# Patient Record
Sex: FEMALE | Race: WHITE | Employment: OTHER | URBAN - METROPOLITAN AREA
[De-identification: names, ages, dates, MRNs, and addresses within clinical notes are randomized per-mention and may not be internally consistent; named-entity substitution may affect disease eponyms.]

---

## 2019-07-02 ENCOUNTER — HOSPITAL ENCOUNTER (OUTPATIENT)
Dept: PREADMISSION TESTING | Age: 69
Discharge: HOME | End: 2019-07-06
Payer: MEDICARE

## 2019-07-02 VITALS
BODY MASS INDEX: 26.01 KG/M2 | OXYGEN SATURATION: 97 % | WEIGHT: 156.09 LBS | HEIGHT: 65 IN | HEART RATE: 74 BPM | RESPIRATION RATE: 16 BRPM | SYSTOLIC BLOOD PRESSURE: 177 MMHG | DIASTOLIC BLOOD PRESSURE: 83 MMHG

## 2019-07-02 LAB
HCT VFR BLD CALC: 43.7 % (ref 36.3–47.1)
HEMOGLOBIN: 14.1 G/DL (ref 11.9–15.1)
MCH RBC QN AUTO: 28.3 PG (ref 25.2–33.5)
MCHC RBC AUTO-ENTMCNC: 32.3 G/DL (ref 28.4–34.8)
MCV RBC AUTO: 87.8 FL (ref 82.6–102.9)
NRBC AUTOMATED: 0 PER 100 WBC
PDW BLD-RTO: 13.8 % (ref 11.8–14.4)
PLATELET # BLD: 220 K/UL (ref 138–453)
PMV BLD AUTO: 10 FL (ref 8.1–13.5)
RBC # BLD: 4.98 M/UL (ref 3.95–5.11)
WBC # BLD: 6.6 K/UL (ref 3.5–11.3)

## 2019-07-02 PROCEDURE — 36415 COLL VENOUS BLD VENIPUNCTURE: CPT

## 2019-07-02 PROCEDURE — 93005 ELECTROCARDIOGRAM TRACING: CPT | Performed by: ANESTHESIOLOGY

## 2019-07-02 PROCEDURE — 85027 COMPLETE CBC AUTOMATED: CPT

## 2019-07-02 RX ORDER — LEVOTHYROXINE SODIUM 0.07 MG/1
75 TABLET ORAL DAILY
COMMUNITY

## 2019-07-02 NOTE — H&P
History and Physical Service   [facility]    HISTORY AND PHYSICAL EXAMINATION            Date of Evaluation: 7/2/2019  Patient name:  Booker Cope  MRN:   [id number]  YOB: 1950  PCP:    Radames Pyle MD    History Obtained From:     Patient, medical records    History of Present Illness: This is Booker Cope a 81 y.o. female presents to [facility] for upcoming Right foot Padgett bunionectomy with 2nd Met Osteotomy by  Dr Giovani Mead for Right HAV scheduled on 7/10/19 @1030. The patient c/o bunion on right foot for many years that has progressively worsened. . More recently 2nd toe developed pain as well that is aggravated by standing long periods, walking and wearing closed shoes. She has developed a callus but no open sores. Due to discomfort agreed to elective surgical intervention. The patient denies fever, chills, night sweats, pain or unexplained weight loss. Past Medical History:     Past Medical History:   Diagnosis Date    Anesthesia complication     was told after her hysterectomy that \"they almost lost her\" d/t an anesthesia complication, but never told her what it was, has had anes. since without any problems    Kidney cysts 03/2019    Palpitations     recently d/t the heat (pt's air conditioner isn't working) written 7/2/2019    Splenic cyst 03/2019    Tennis elbow     left    Thyroid disease         Past Surgical History:     Past Surgical History:   Procedure Laterality Date    BACK SURGERY      disc removed (lumbar)    BREAST SURGERY Right     cyst removed (benign)    COLONOSCOPY      DILATION AND CURETTAGE OF UTERUS      HYSTERECTOMY          Medications Prior to Admission:     Prior to Admission medications    Medication Sig Start Date End Date Taking?  Authorizing Provider   levothyroxine (SYNTHROID) 75 MCG tablet Take 75 mcg by mouth Daily   Yes Historical Provider, MD        Allergies:     Bactrim [sulfamethoxazole-trimethoprim]    Social atraumatic. Eye: no icterus, redness, pupils equal and reactive, extraocular eye movements intact, conjunctiva clear  Ear: normal external ear, no discharge, hearing intact  Nose:  no drainage noted  Mouth: mucous membranes moist  Neck: supple, no carotid bruits, thyroid not palpable  Lungs: Bilateral equal air entry, clear to ausculation, no wheezing, rales or rhonchi, normal effort  Cardiovascular: normal rate, regular rhythm, no murmur, gallop, rub. Abdomen: Soft, nontender, nondistended, normal bowel sounds, no hepatomegaly or splenomegaly  Neurologic: There are no new focal motor or sensory deficits, normal muscle tone and bulk, no abnormal sensation, normal speech, cranial nerves II through XII grossly intact  Skin: No gross lesions, rashes, bruising or bleeding on exposed skin area  Extremities: Bunion right hallux with callus  Second right toe mild deformity with tenderness at base peripheral pulses palpable, no pedal edema or calf pain with palpation  Psych: normal affect     Investigations:      Laboratory Testing:  Recent Results (from the past 24 hour(s))   CBC    Collection Time: 07/02/19 11:51 AM   Result Value Ref Range    WBC 6.6 3.5 - 11.3 k/uL    RBC 4.98 3.95 - 5.11 m/uL    Hemoglobin 14.1 11.9 - 15.1 g/dL    Hematocrit 43.7 36.3 - 47.1 %    MCV 87.8 82.6 - 102.9 fL    MCH 28.3 25.2 - 33.5 pg    MCHC 32.3 28.4 - 34.8 g/dL    RDW 13.8 11.8 - 14.4 %    Platelets 417 990 - 184 k/uL    MPV 10.0 8.1 - 13.5 fL    NRBC Automated 0.0 0.0 per 100 WBC       Recent Labs     07/02/19  1151   HGB 14.1   HCT 43.7   WBC 6.6   MCV 87.8       Imaging/Diagnostics:        Diagnosis:      1. Right HAV    Plans:     1.  Right foot Padgett bunionectomy with 2nd Met Osteotomy     Jose Ponce, APRN - CNP  7/2/2019  12:17 PM

## 2019-07-02 NOTE — PRE-PROCEDURE INSTRUCTIONS
ARRIVE  [place] Wednesday, July 10 at 9:00 AM    Once you enter the hospital lobby, take the elevators to the second floor. Check-In is at the surgery registration desk. If you have been given a blood band, you must bring it with you the day of surgery. Continue to take your home medications as you normally do up to and including the night before surgery with the exception of any blood thinning medications. Please stop any blood thinning medications as directed by your surgeon or prescribing physician. Failure to stop certain medications may interfere with your scheduled surgery. These may include:  Aspirin, Warfarin (Coumadin), Clopidogrel (Plavix), Ibuprofen (Motrin, Advil), Naproxen (Aleve), Meloxicam (Mobic), Celecoxib (Celebrex), Eliquis, Pradaxa, Xarelto, Effient, Fish Oil, Herbal supplements. If you are diabetic, do not take any of your diabetic medications by mouth the morning of surgery. If you are taking insulin contact the doctor that manages your diabetes for instructions about any changes to your insulin dosages the day before surgery. Do not inject insulin or other injectable diabetic medications the morning of surgery unless otherwise instructed by the doctor who manages your diabetes. Please take the following medication(s) the day of surgery with a small sip of water:  synthroid  Please use your inhalers at home the day of surgery. PREPARING FOR YOUR SURGERY:     Before surgery, you can play an important role in your own health. Because skin is not sterile, we need to be sure that your skin is as free of germs as possible before surgery by carefully washing before surgery. Preparing or prepping skin before surgery can reduce the risk of a surgical site infection.   Do not shave the area of your body where your surgery will be performed unless you received specific permission from your physician.     You will need to shower at home the night before surgery and the morning of surgery with a special soap called chlorhexidine gluconate (CHG*). *Not to be used by people allergic to Chlorhexidine Gluconate (CHG). Following these instructions will help you be sure that your skin is clean before surgery. Instructions on cleaning your skin before surgery: The night before your surgery:      You will need to shower with warm water (not hot) and the CHG soap.  Use a clean wash cloth and a clean towel. Have clean clothes available to put on after the shower.    First wash your hair with regular shampoo. Rinse your hair and body thoroughly to remove the shampoo. Iyer Wash your face with your regular soap or water only. Thoroughly rinse your body with warm water from the neck down.  Turn water off to prevent rinsing the soap off too soon.  With a clean wet washcloth and half of the CHG soap in the bottle, lather your entire body from the neck down. Do not use CHG soap near your eyes or ears to avoid injury to those areas.  Wash thoroughly, paying special attention to the area where your surgery will be performed.  Wash your body gently for five (5) minutes. Avoid scrubbing your skin too hard.  Turn the water back on and rinse your body thoroughly.  Pat yourself dry with a clean, soft towel. Do not apply lotion, cream or powder.  Dress with clean freshly washed clothes. The morning of surgery:     Repeat shower following steps above - using remaining half of CHG soap in bottle. Patient Instructions:    Iyer If you are having any type of anesthesia you are to have nothing to eat or drink after midnight the night before your surgery. This includes gum, mints, water or smoking or chewing tobacco.  The only exception to this is a small sip of water to take with any morning dose of heart, blood pressure, or seizure medications. No alcoholic beverages for 24 hours prior to surgery.      Bring a list of all Surgery/Procedure: You will need a friend or family member to drive you home after your procedure. Your  must be 25years of age or older and able to sign off on your discharge instructions. A taxi cab or any other form of public transportation is not acceptable. Your friend or family member must stay at the hospital throughout your procedure. Someone must remain with you for the first 24 hours after your surgery if you receive anesthesia or medication. If you do not have someone to stay with you, your procedure may be cancelled.       If you have any other questions regarding your procedure or the day of surgery, please call [phone]      _________________________  ____________________________  Signature (Patient)              Signature (Provider) & date

## 2019-07-03 LAB
EKG ATRIAL RATE: 69 BPM
EKG P AXIS: 66 DEGREES
EKG P-R INTERVAL: 150 MS
EKG Q-T INTERVAL: 378 MS
EKG QRS DURATION: 88 MS
EKG QTC CALCULATION (BAZETT): 405 MS
EKG R AXIS: 22 DEGREES
EKG T AXIS: 59 DEGREES
EKG VENTRICULAR RATE: 69 BPM

## 2023-09-07 ENCOUNTER — APPOINTMENT (OUTPATIENT)
Dept: CT IMAGING | Age: 73
End: 2023-09-07
Payer: MEDICARE

## 2023-09-07 ENCOUNTER — APPOINTMENT (OUTPATIENT)
Dept: GENERAL RADIOLOGY | Age: 73
End: 2023-09-07
Payer: MEDICARE

## 2023-09-07 ENCOUNTER — HOSPITAL ENCOUNTER (INPATIENT)
Age: 73
LOS: 1 days | Discharge: HOME | End: 2023-09-09
Attending: EMERGENCY MEDICINE | Admitting: HOSPITALIST
Payer: MEDICARE

## 2023-09-07 DIAGNOSIS — G45.9 TIA (TRANSIENT ISCHEMIC ATTACK): Primary | ICD-10-CM

## 2023-09-07 LAB
ALBUMIN SERPL-MCNC: 4 G/DL (ref 3.5–5.2)
ALP SERPL-CCNC: 169 U/L (ref 35–104)
ALT SERPL-CCNC: 19 U/L (ref 5–33)
ANION GAP SERPL CALCULATED.3IONS-SCNC: 14 MMOL/L (ref 9–17)
AST SERPL-CCNC: 29 U/L
BASOPHILS # BLD: <0.03 K/UL (ref 0–0.2)
BASOPHILS NFR BLD: 0 % (ref 0–2)
BILIRUB SERPL-MCNC: 0.5 MG/DL (ref 0.3–1.2)
BILIRUB UR QL STRIP: NEGATIVE
BUN SERPL-MCNC: 17 MG/DL (ref 8–23)
BUN/CREAT SERPL: 24 (ref 9–20)
CALCIUM SERPL-MCNC: 9.2 MG/DL (ref 8.6–10.4)
CHLORIDE SERPL-SCNC: 101 MMOL/L (ref 98–107)
CLARITY UR: CLEAR
CO2 SERPL-SCNC: 23 MMOL/L (ref 20–31)
COLOR UR: YELLOW
CREAT SERPL-MCNC: 0.7 MG/DL (ref 0.5–0.9)
EOSINOPHIL # BLD: 0.06 K/UL (ref 0–0.44)
EOSINOPHILS RELATIVE PERCENT: 1 % (ref 1–4)
EPI CELLS #/AREA URNS HPF: NORMAL /HPF (ref 0–5)
ERYTHROCYTE [DISTWIDTH] IN BLOOD BY AUTOMATED COUNT: 13.4 % (ref 11.8–14.4)
GFR SERPL CREATININE-BSD FRML MDRD: >60 ML/MIN/1.73M2
GLUCOSE BLD-MCNC: 131 MG/DL (ref 65–105)
GLUCOSE SERPL-MCNC: 118 MG/DL (ref 70–99)
GLUCOSE UR STRIP-MCNC: NEGATIVE MG/DL
HCT VFR BLD AUTO: 44.6 % (ref 36.3–47.1)
HGB BLD-MCNC: 14.5 G/DL (ref 11.9–15.1)
HGB UR QL STRIP.AUTO: ABNORMAL
IMM GRANULOCYTES # BLD AUTO: 0.01 K/UL (ref 0–0.3)
IMM GRANULOCYTES NFR BLD: 0 %
INR PPP: 0.9
KETONES UR STRIP-MCNC: NEGATIVE MG/DL
LEUKOCYTE ESTERASE UR QL STRIP: ABNORMAL
LYMPHOCYTES NFR BLD: 2.29 K/UL (ref 1.1–3.7)
LYMPHOCYTES RELATIVE PERCENT: 34 % (ref 24–43)
MCH RBC QN AUTO: 29.1 PG (ref 25.2–33.5)
MCHC RBC AUTO-ENTMCNC: 32.5 G/DL (ref 28.4–34.8)
MCV RBC AUTO: 89.4 FL (ref 82.6–102.9)
MONOCYTES NFR BLD: 0.39 K/UL (ref 0.1–1.2)
MONOCYTES NFR BLD: 6 % (ref 3–12)
NEUTROPHILS NFR BLD: 59 % (ref 36–65)
NEUTS SEG NFR BLD: 4.04 K/UL (ref 1.5–8.1)
NITRITE UR QL STRIP: NEGATIVE
NRBC BLD-RTO: 0 PER 100 WBC
PARTIAL THROMBOPLASTIN TIME: 27.5 SEC (ref 23–36.5)
PH UR STRIP: 7 [PH] (ref 5–8)
PLATELET # BLD AUTO: 182 K/UL (ref 138–453)
PMV BLD AUTO: 10.8 FL (ref 8.1–13.5)
POTASSIUM SERPL-SCNC: 4.1 MMOL/L (ref 3.7–5.3)
PROT SERPL-MCNC: 7.4 G/DL (ref 6.4–8.3)
PROT UR STRIP-MCNC: NEGATIVE MG/DL
PROTHROMBIN TIME: 11.6 SEC (ref 11.7–14.9)
RBC # BLD AUTO: 4.99 M/UL (ref 3.95–5.11)
RBC #/AREA URNS HPF: NORMAL /HPF (ref 0–2)
SODIUM SERPL-SCNC: 138 MMOL/L (ref 135–144)
SP GR UR STRIP: 1.01 (ref 1–1.03)
TROPONIN I SERPL HS-MCNC: <6 NG/L (ref 0–14)
UROBILINOGEN UR STRIP-ACNC: NORMAL EU/DL (ref 0–1)
WBC #/AREA URNS HPF: NORMAL /HPF (ref 0–5)
WBC OTHER # BLD: 6.8 K/UL (ref 3.5–11.3)

## 2023-09-07 PROCEDURE — 80053 COMPREHEN METABOLIC PANEL: CPT

## 2023-09-07 PROCEDURE — 70496 CT ANGIOGRAPHY HEAD: CPT

## 2023-09-07 PROCEDURE — 85025 COMPLETE CBC W/AUTO DIFF WBC: CPT

## 2023-09-07 PROCEDURE — 81001 URINALYSIS AUTO W/SCOPE: CPT

## 2023-09-07 PROCEDURE — 93005 ELECTROCARDIOGRAM TRACING: CPT | Performed by: EMERGENCY MEDICINE

## 2023-09-07 PROCEDURE — 82947 ASSAY GLUCOSE BLOOD QUANT: CPT

## 2023-09-07 PROCEDURE — 85610 PROTHROMBIN TIME: CPT

## 2023-09-07 PROCEDURE — 85730 THROMBOPLASTIN TIME PARTIAL: CPT

## 2023-09-07 PROCEDURE — 84484 ASSAY OF TROPONIN QUANT: CPT

## 2023-09-07 PROCEDURE — 70450 CT HEAD/BRAIN W/O DYE: CPT

## 2023-09-07 PROCEDURE — 6360000004 HC RX CONTRAST MEDICATION: Performed by: EMERGENCY MEDICINE

## 2023-09-07 PROCEDURE — 99285 EMERGENCY DEPT VISIT HI MDM: CPT

## 2023-09-07 PROCEDURE — 2580000003 HC RX 258: Performed by: EMERGENCY MEDICINE

## 2023-09-07 PROCEDURE — 71045 X-RAY EXAM CHEST 1 VIEW: CPT

## 2023-09-07 RX ORDER — SODIUM CHLORIDE 0.9 % (FLUSH) 0.9 %
10 SYRINGE (ML) INJECTION PRN
Status: DISCONTINUED | OUTPATIENT
Start: 2023-09-07 | End: 2023-09-09 | Stop reason: HOSPADM

## 2023-09-07 RX ORDER — CLOPIDOGREL BISULFATE 75 MG/1
300 TABLET ORAL ONCE
Status: COMPLETED | OUTPATIENT
Start: 2023-09-08 | End: 2023-09-08

## 2023-09-07 RX ORDER — 0.9 % SODIUM CHLORIDE 0.9 %
80 INTRAVENOUS SOLUTION INTRAVENOUS ONCE
Status: COMPLETED | OUTPATIENT
Start: 2023-09-07 | End: 2023-09-07

## 2023-09-07 RX ORDER — ATORVASTATIN CALCIUM 80 MG/1
80 TABLET, FILM COATED ORAL NIGHTLY
Status: DISCONTINUED | OUTPATIENT
Start: 2023-09-08 | End: 2023-09-09 | Stop reason: HOSPADM

## 2023-09-07 RX ORDER — ASPIRIN 81 MG/1
324 TABLET, CHEWABLE ORAL ONCE
Status: COMPLETED | OUTPATIENT
Start: 2023-09-08 | End: 2023-09-08

## 2023-09-07 RX ADMIN — SODIUM CHLORIDE 80 ML: 9 INJECTION, SOLUTION INTRAVENOUS at 21:06

## 2023-09-07 RX ADMIN — SODIUM CHLORIDE, PRESERVATIVE FREE 10 ML: 5 INJECTION INTRAVENOUS at 21:06

## 2023-09-07 RX ADMIN — IOPAMIDOL 75 ML: 755 INJECTION, SOLUTION INTRAVENOUS at 21:06

## 2023-09-07 NOTE — ED PROVIDER NOTES
[facility]  Emergency Department Encounter  Emergency Medicine Physician Note     Pt Mauri French  MRN: [id number]  [address] 1950  Date of evaluation: 9/7/23  PCP:  Giselle Mckeon MD  Note Started: 7:22 PM EDT      CHIEF COMPLAINT       Chief Complaint   Patient presents with    Hypertension    Extremity Weakness     Pt to ED with complaints on numbness/tingling to right side of face and weakness to the lower extremities. Pt states that symptoms started about 45 minutes PTA while she was eating dinner. Pt denies any chest pain or SOB. HISTORY OF PRESENT ILLNESS  (Location/Symptom, Timing/Onset, Context/Setting, Quality, Duration, Modifying Factors, Severity.)      George Jade is a 72 y.o. female who presents with onset numbness and tingling on the right face and right side of the body, with weakness on the right side of the body. Patient also describes some vision changes. Patient states sudden onset while eating dinner approximately around 645. Patient has a history of carotid stenosis has been monitored with ultrasounds. Patient has a history of hypertension,'s been controlled on blood pressure medication. Patient noted to be hypertensive here. Patient describes generalized fatigue at this time, describes change in phonation. Patient denies any lightheadedness dizziness, chest pain at this time, did describe some chest pressure/tightness at the onset of symptoms. .  Patient has any abdominal pain. PAST MEDICAL / SURGICAL / SOCIAL / FAMILY HISTORY      has a past medical history of Anesthesia complication, Kidney cysts, Palpitations, Splenic cyst, Tennis elbow, and Thyroid disease. No additional pertinent        has a past surgical history that includes Hysterectomy (11/01/1999); back surgery (06/10/2010); Breast surgery (Right, 09/1978); Colonoscopy (06/2014); and Dilation and curettage of uterus.   No additional pertinent       Social History     Socioeconomic Discussed patient with neurology, they are recommending 300 mg Plavix, 40 of Lipitor, and request admission for MRI. Will admit patient. Plan to also give patient aspirin. [CR]   5362 Neurology also recommended 75 mg Plavix daily. [CR]      ED Course User Index  [CR] Carlos Dolan,        PROCEDURES:  None Performed   Procedures    DATA FOR LAB AND RADIOLOGY TESTS ORDERED BELOW ARE REVIEWED BY THE ED CLINICIAN:    RADIOLOGY: All x-rays, CT, MRI, and formal ultrasound images (except ED bedside ultrasound) are read by the radiologist, see reports below, unless otherwise noted in MDM or here. Reports below are reviewed by myself. CTA HEAD NECK W CONTRAST   Final Result   No flow-limiting large vessel stenosis in the head and neck. CT HEAD WO CONTRAST   Final Result   No acute intracranial abnormality. XR CHEST PORTABLE   Final Result   No acute cardiopulmonary findings. LABS: Lab orders shown below, the results are reviewed by myself, and all abnormals are listed below.   Labs Reviewed   COMPREHENSIVE METABOLIC PANEL - Abnormal; Notable for the following components:       Result Value    Glucose 118 (*)     Bun/Cre Ratio 24 (*)     Alkaline Phosphatase 169 (*)     All other components within normal limits   PROTIME-INR - Abnormal; Notable for the following components:    Protime 11.6 (*)     All other components within normal limits   URINALYSIS WITH REFLEX TO CULTURE - Abnormal; Notable for the following components:    Urine Hgb TRACE (*)     Leukocyte Esterase, Urine SMALL (*)     All other components within normal limits   POC GLUCOSE FINGERSTICK - Abnormal; Notable for the following components:    POC Glucose 131 (*)     All other components within normal limits   CBC WITH AUTO DIFFERENTIAL   APTT   TROPONIN   MICROSCOPIC URINALYSIS   BASIC METABOLIC PANEL W/ REFLEX TO MG FOR LOW K       Vitals Reviewed:    Vitals:    09/08/23 0045 09/08/23 0157 09/08/23 0545 09/08/23

## 2023-09-08 ENCOUNTER — APPOINTMENT (OUTPATIENT)
Age: 73
End: 2023-09-08
Attending: FAMILY MEDICINE
Payer: MEDICARE

## 2023-09-08 ENCOUNTER — APPOINTMENT (OUTPATIENT)
Dept: MRI IMAGING | Age: 73
End: 2023-09-08
Payer: MEDICARE

## 2023-09-08 PROBLEM — R29.90 STROKE-LIKE SYMPTOM: Status: ACTIVE | Noted: 2023-09-08

## 2023-09-08 PROBLEM — G45.9 TIA (TRANSIENT ISCHEMIC ATTACK): Status: ACTIVE | Noted: 2023-09-08

## 2023-09-08 LAB
ANION GAP SERPL CALCULATED.3IONS-SCNC: 12 MMOL/L (ref 9–17)
BUN SERPL-MCNC: 12 MG/DL (ref 8–23)
BUN/CREAT SERPL: 20 (ref 9–20)
CALCIUM SERPL-MCNC: 9.5 MG/DL (ref 8.6–10.4)
CHLORIDE SERPL-SCNC: 105 MMOL/L (ref 98–107)
CHOLEST SERPL-MCNC: 160 MG/DL
CHOLESTEROL/HDL RATIO: 1.6
CO2 SERPL-SCNC: 25 MMOL/L (ref 20–31)
CREAT SERPL-MCNC: 0.6 MG/DL (ref 0.5–0.9)
ECHO AO ROOT DIAM: 2.7 CM
ECHO AO ROOT INDEX: 1.52 CM/M2
ECHO AV PEAK GRADIENT: 7 MMHG
ECHO AV PEAK VELOCITY: 1.3 M/S
ECHO BSA: 1.8 M2
ECHO EST RA PRESSURE: 3 MMHG
ECHO LA AREA 4C: 12.2 CM2
ECHO LA DIAMETER INDEX: 1.8 CM/M2
ECHO LA DIAMETER: 3.2 CM
ECHO LA MAJOR AXIS: 4.7 CM
ECHO LA TO AORTIC ROOT RATIO: 1.19
ECHO LA VOL 4C: 26 ML (ref 22–52)
ECHO LA VOLUME INDEX A4C: 15 ML/M2 (ref 16–34)
ECHO LV E' LATERAL VELOCITY: 10 CM/S
ECHO LV E' SEPTAL VELOCITY: 7 CM/S
ECHO LV FRACTIONAL SHORTENING: 26 % (ref 28–44)
ECHO LV INTERNAL DIMENSION DIASTOLE INDEX: 2.58 CM/M2
ECHO LV INTERNAL DIMENSION DIASTOLIC: 4.6 CM (ref 3.9–5.3)
ECHO LV INTERNAL DIMENSION SYSTOLIC INDEX: 1.91 CM/M2
ECHO LV INTERNAL DIMENSION SYSTOLIC: 3.4 CM
ECHO LV IVSD: 0.8 CM (ref 0.6–0.9)
ECHO LV MASS 2D: 117.9 G (ref 67–162)
ECHO LV MASS INDEX 2D: 66.2 G/M2 (ref 43–95)
ECHO LV POSTERIOR WALL DIASTOLIC: 0.8 CM (ref 0.6–0.9)
ECHO LV RELATIVE WALL THICKNESS RATIO: 0.35
ECHO MV A VELOCITY: 0.96 M/S
ECHO MV E DECELERATION TIME (DT): 155 MS
ECHO MV E VELOCITY: 0.64 M/S
ECHO MV E/A RATIO: 0.67
ECHO MV E/E' LATERAL: 6.4
ECHO MV E/E' RATIO (AVERAGED): 7.77
ECHO MV E/E' SEPTAL: 9.14
ECHO RIGHT VENTRICULAR SYSTOLIC PRESSURE (RVSP): 26 MMHG
ECHO TV REGURGITANT MAX VELOCITY: 2.38 M/S
ECHO TV REGURGITANT PEAK GRADIENT: 23 MMHG
EKG ATRIAL RATE: 79 BPM
EKG P AXIS: 66 DEGREES
EKG P-R INTERVAL: 144 MS
EKG Q-T INTERVAL: 362 MS
EKG QRS DURATION: 72 MS
EKG QTC CALCULATION (BAZETT): 415 MS
EKG R AXIS: 15 DEGREES
EKG T AXIS: 81 DEGREES
EKG VENTRICULAR RATE: 79 BPM
EST. AVERAGE GLUCOSE BLD GHB EST-MCNC: 126 MG/DL
GFR SERPL CREATININE-BSD FRML MDRD: >60 ML/MIN/1.73M2
GLUCOSE BLD-MCNC: 105 MG/DL (ref 65–105)
GLUCOSE BLD-MCNC: 92 MG/DL (ref 65–105)
GLUCOSE SERPL-MCNC: 89 MG/DL (ref 70–99)
HBA1C MFR BLD: 6 % (ref 4–6)
HDLC SERPL-MCNC: 100 MG/DL
LDLC SERPL CALC-MCNC: 50 MG/DL (ref 0–130)
POTASSIUM SERPL-SCNC: 3.7 MMOL/L (ref 3.7–5.3)
SODIUM SERPL-SCNC: 142 MMOL/L (ref 135–144)
TRIGL SERPL-MCNC: 51 MG/DL

## 2023-09-08 PROCEDURE — 80048 BASIC METABOLIC PNL TOTAL CA: CPT

## 2023-09-08 PROCEDURE — 70551 MRI BRAIN STEM W/O DYE: CPT

## 2023-09-08 PROCEDURE — 2580000003 HC RX 258: Performed by: NURSE PRACTITIONER

## 2023-09-08 PROCEDURE — 93306 TTE W/DOPPLER COMPLETE: CPT | Performed by: INTERNAL MEDICINE

## 2023-09-08 PROCEDURE — 99222 1ST HOSP IP/OBS MODERATE 55: CPT | Performed by: PSYCHIATRY & NEUROLOGY

## 2023-09-08 PROCEDURE — 6360000002 HC RX W HCPCS: Performed by: NURSE PRACTITIONER

## 2023-09-08 PROCEDURE — 97535 SELF CARE MNGMENT TRAINING: CPT

## 2023-09-08 PROCEDURE — 93306 TTE W/DOPPLER COMPLETE: CPT

## 2023-09-08 PROCEDURE — 80061 LIPID PANEL: CPT

## 2023-09-08 PROCEDURE — 82947 ASSAY GLUCOSE BLOOD QUANT: CPT

## 2023-09-08 PROCEDURE — 6370000000 HC RX 637 (ALT 250 FOR IP): Performed by: NURSE PRACTITIONER

## 2023-09-08 PROCEDURE — 97116 GAIT TRAINING THERAPY: CPT

## 2023-09-08 PROCEDURE — A4216 STERILE WATER/SALINE, 10 ML: HCPCS | Performed by: NURSE PRACTITIONER

## 2023-09-08 PROCEDURE — 83036 HEMOGLOBIN GLYCOSYLATED A1C: CPT

## 2023-09-08 PROCEDURE — 1200000000 HC SEMI PRIVATE

## 2023-09-08 PROCEDURE — 6370000000 HC RX 637 (ALT 250 FOR IP): Performed by: EMERGENCY MEDICINE

## 2023-09-08 PROCEDURE — 6370000000 HC RX 637 (ALT 250 FOR IP): Performed by: FAMILY MEDICINE

## 2023-09-08 PROCEDURE — 97162 PT EVAL MOD COMPLEX 30 MIN: CPT

## 2023-09-08 PROCEDURE — 97530 THERAPEUTIC ACTIVITIES: CPT

## 2023-09-08 PROCEDURE — 97166 OT EVAL MOD COMPLEX 45 MIN: CPT

## 2023-09-08 PROCEDURE — 2500000003 HC RX 250 WO HCPCS: Performed by: NURSE PRACTITIONER

## 2023-09-08 PROCEDURE — 36415 COLL VENOUS BLD VENIPUNCTURE: CPT

## 2023-09-08 PROCEDURE — 93010 ELECTROCARDIOGRAM REPORT: CPT | Performed by: INTERNAL MEDICINE

## 2023-09-08 RX ORDER — ONDANSETRON 2 MG/ML
4 INJECTION INTRAMUSCULAR; INTRAVENOUS EVERY 6 HOURS PRN
Status: DISCONTINUED | OUTPATIENT
Start: 2023-09-08 | End: 2023-09-09 | Stop reason: HOSPADM

## 2023-09-08 RX ORDER — ONDANSETRON 4 MG/1
4 TABLET, ORALLY DISINTEGRATING ORAL EVERY 8 HOURS PRN
Status: DISCONTINUED | OUTPATIENT
Start: 2023-09-08 | End: 2023-09-09 | Stop reason: HOSPADM

## 2023-09-08 RX ORDER — ACETAMINOPHEN 325 MG/1
650 TABLET ORAL EVERY 6 HOURS PRN
Status: DISCONTINUED | OUTPATIENT
Start: 2023-09-08 | End: 2023-09-09 | Stop reason: HOSPADM

## 2023-09-08 RX ORDER — SODIUM CHLORIDE 9 MG/ML
INJECTION, SOLUTION INTRAVENOUS PRN
Status: DISCONTINUED | OUTPATIENT
Start: 2023-09-08 | End: 2023-09-09 | Stop reason: HOSPADM

## 2023-09-08 RX ORDER — LEVOTHYROXINE SODIUM 0.07 MG/1
75 TABLET ORAL DAILY
Status: DISCONTINUED | OUTPATIENT
Start: 2023-09-08 | End: 2023-09-09 | Stop reason: HOSPADM

## 2023-09-08 RX ORDER — ASPIRIN 81 MG/1
81 TABLET, CHEWABLE ORAL DAILY
Status: DISCONTINUED | OUTPATIENT
Start: 2023-09-08 | End: 2023-09-09 | Stop reason: HOSPADM

## 2023-09-08 RX ORDER — ENOXAPARIN SODIUM 100 MG/ML
40 INJECTION SUBCUTANEOUS DAILY
Status: DISCONTINUED | OUTPATIENT
Start: 2023-09-08 | End: 2023-09-09 | Stop reason: HOSPADM

## 2023-09-08 RX ORDER — AMLODIPINE BESYLATE 2.5 MG/1
2.5 TABLET ORAL DAILY
COMMUNITY

## 2023-09-08 RX ORDER — AMLODIPINE BESYLATE 2.5 MG/1
2.5 TABLET ORAL DAILY
Status: DISCONTINUED | OUTPATIENT
Start: 2023-09-08 | End: 2023-09-09 | Stop reason: HOSPADM

## 2023-09-08 RX ORDER — FAMOTIDINE 20 MG/1
20 TABLET, FILM COATED ORAL DAILY
Status: DISCONTINUED | OUTPATIENT
Start: 2023-09-09 | End: 2023-09-09 | Stop reason: HOSPADM

## 2023-09-08 RX ORDER — SODIUM CHLORIDE 0.9 % (FLUSH) 0.9 %
10 SYRINGE (ML) INJECTION PRN
Status: DISCONTINUED | OUTPATIENT
Start: 2023-09-08 | End: 2023-09-09 | Stop reason: HOSPADM

## 2023-09-08 RX ORDER — CLOPIDOGREL BISULFATE 75 MG/1
75 TABLET ORAL DAILY
Status: DISCONTINUED | OUTPATIENT
Start: 2023-09-08 | End: 2023-09-09 | Stop reason: HOSPADM

## 2023-09-08 RX ORDER — ACETAMINOPHEN 650 MG/1
650 SUPPOSITORY RECTAL EVERY 6 HOURS PRN
Status: DISCONTINUED | OUTPATIENT
Start: 2023-09-08 | End: 2023-09-09 | Stop reason: HOSPADM

## 2023-09-08 RX ORDER — POTASSIUM CHLORIDE 7.45 MG/ML
10 INJECTION INTRAVENOUS PRN
Status: DISCONTINUED | OUTPATIENT
Start: 2023-09-08 | End: 2023-09-09 | Stop reason: HOSPADM

## 2023-09-08 RX ORDER — MAGNESIUM SULFATE IN WATER 40 MG/ML
2000 INJECTION, SOLUTION INTRAVENOUS PRN
Status: DISCONTINUED | OUTPATIENT
Start: 2023-09-08 | End: 2023-09-09 | Stop reason: HOSPADM

## 2023-09-08 RX ORDER — SODIUM CHLORIDE 0.9 % (FLUSH) 0.9 %
10 SYRINGE (ML) INJECTION EVERY 12 HOURS SCHEDULED
Status: DISCONTINUED | OUTPATIENT
Start: 2023-09-08 | End: 2023-09-09 | Stop reason: HOSPADM

## 2023-09-08 RX ADMIN — FAMOTIDINE 20 MG: 10 INJECTION, SOLUTION INTRAVENOUS at 07:53

## 2023-09-08 RX ADMIN — ATORVASTATIN CALCIUM 80 MG: 80 TABLET, FILM COATED ORAL at 19:38

## 2023-09-08 RX ADMIN — SODIUM CHLORIDE, PRESERVATIVE FREE 10 ML: 5 INJECTION INTRAVENOUS at 19:40

## 2023-09-08 RX ADMIN — ATORVASTATIN CALCIUM 80 MG: 80 TABLET, FILM COATED ORAL at 00:14

## 2023-09-08 RX ADMIN — CLOPIDOGREL BISULFATE 300 MG: 75 TABLET ORAL at 00:12

## 2023-09-08 RX ADMIN — AMLODIPINE BESYLATE 2.5 MG: 2.5 TABLET ORAL at 11:16

## 2023-09-08 RX ADMIN — ASPIRIN 81 MG CHEWABLE TABLET 324 MG: 81 TABLET CHEWABLE at 00:12

## 2023-09-08 RX ADMIN — ENOXAPARIN SODIUM 40 MG: 100 INJECTION SUBCUTANEOUS at 07:53

## 2023-09-08 RX ADMIN — ACETAMINOPHEN 325 MG: 325 TABLET ORAL at 16:52

## 2023-09-08 RX ADMIN — ONDANSETRON 4 MG: 4 TABLET, ORALLY DISINTEGRATING ORAL at 19:38

## 2023-09-08 RX ADMIN — SODIUM CHLORIDE, PRESERVATIVE FREE 10 ML: 5 INJECTION INTRAVENOUS at 07:52

## 2023-09-08 ASSESSMENT — ENCOUNTER SYMPTOMS
SHORTNESS OF BREATH: 0
VOICE CHANGE: 1
CHEST TIGHTNESS: 1

## 2023-09-08 ASSESSMENT — PAIN DESCRIPTION - DESCRIPTORS: DESCRIPTORS: ACHING

## 2023-09-08 ASSESSMENT — PAIN DESCRIPTION - LOCATION: LOCATION: HEAD

## 2023-09-08 NOTE — ACP (ADVANCE CARE PLANNING)
Advance Care Planning     Advance Care Planning Activator (Inpatient)  Conversation Note      Date of ACP Conversation: 9/8/2023     Conversation Conducted with: Patient with Decision Making Capacity    ACP Activator: Cleveland Singletary RN          Health Care Decision Maker:     Current Designated Health Care Decision Maker:     Primary Decision Maker: Tracie Barton - [phone]  Click here to complete Healthcare Decision Makers including section of the Healthcare Decision Maker Relationship (ie \"Primary\")  Today we documented Decision Maker(s) consistent with Legal Next of Kin hierarchy. Care Preferences    Ventilation: \"If you were in your present state of health and suddenly became very ill and were unable to breathe on your own, what would your preference be about the use of a ventilator (breathing machine) if it were available to you? \"      Would the patient desire the use of ventilator (breathing machine)?: yes    \"If your health worsens and it becomes clear that your chance of recovery is unlikely, what would your preference be about the use of a ventilator (breathing machine) if it were available to you? \"     Would the patient desire the use of ventilator (breathing machine)?: No      Resuscitation  \"CPR works best to restart the heart when there is a sudden event, like a heart attack, in someone who is otherwise healthy. Unfortunately, CPR does not typically restart the heart for people who have serious health conditions or who are very sick. \"    \"In the event your heart stopped as a result of an underlying serious health condition, would you want attempts to be made to restart your heart (answer \"yes\" for attempt to resuscitate) or would you prefer a natural death (answer \"no\" for do not attempt to resuscitate)? \" yes       [] Yes   [] No   Educated Patient / Emilee Krause regarding differences between Advance Directives and portable DNR orders.     Length of ACP Conversation in minutes:

## 2023-09-08 NOTE — CARE COORDINATION
Case Management Assessment  Initial Evaluation    Date/Time of Evaluation: 9/8/2023 11:36 AM  Assessment Completed by: Ok Lindsay RN    If patient is discharged prior to next notation, then this note serves as note for discharge by case management. Patient Name: Hermelinda Lawton                   YOB: 1950  Diagnosis: Stroke-like symptom [R29.90]                   Date / Time: 9/7/2023  7:13 PM    Patient Admission Status: Inpatient   Readmission Risk (Low < 19, Mod (19-27), High > 27): Readmission Risk Score: 9.2    Current PCP: Britt Fitch MD  PCP verified by CM? Yes (LOV 5/15)    Chart Reviewed: Yes      History Provided by: Patient  Patient Orientation: Alert and Oriented    Patient Cognition: Alert    Hospitalization in the last 30 days (Readmission):  No    If yes, Readmission Assessment in CM Navigator will be completed. Advance Directives:      Code Status: Full Code   Patient's Primary Decision Maker is: Legal Next of Kin    Primary Decision MakerVhenry Wild - [place] - [phone]    Discharge Planning:    Patient lives with: Spouse/Significant Other Type of Home: House  Primary Care Giver: Self  Patient Support Systems include: Children, Family Members   Current Financial resources: Medicare  Current community resources: None  Current services prior to admission: None            Current DME:              Type of Home Care services:  None    ADLS  Prior functional level: Independent in ADLs/IADLs  Current functional level: Independent in ADLs/IADLs    PT AM-PAC: 20 /24  OT AM-PAC:   /24    Family can provide assistance at DC: No  Would you like Case Management to discuss the discharge plan with any other family members/significant others, and if so, who?  No  Plans to Return to Present Housing: Yes  Other Identified Issues/Barriers to RETURNING to current housing: none   Potential Assistance needed at discharge: N/A            Potential DME:    Patient expects to discharge to: House  Plan for transportation at discharge: Self    Financial    Payor: Shannon Murray / Plan: [address] [place] [address] / Product Type: *No Product type* /     Does insurance require precert for SNF: Yes    Potential assistance Purchasing Medications: No  Meds-to-Beds request: Yes    No Pharmacies Listed    Notes:    Factors facilitating achievement of predicted outcomes: Friend support, Motivated, Cooperative, Pleasant, and Sense of humor    Barriers to discharge: caretaker of her ex     Additional Case Management Notes:   Patient lives with her ex-. She is his caretaker. She is very independent. No dme. Drives. Her spouse had leg amputated 3 years ago and she helps him with all his ADLs. She is admited with stroke like symptoms. She is declining need for home care. Will watch for any outpatient therapy needs. The Plan for Transition of Care is related to the following treatment goals of Stroke-like symptom [F73.49]    IF APPLICABLE: The Patient and/or patient representative Mauricio Kendrick and her family were provided with a choice of provider and agrees with the discharge plan. Freedom of choice list with basic dialogue that supports the patient's individualized plan of care/goals and shares the quality data associated with the providers was provided to: Patient   Patient Representative Name:       The Patient and/or Patient Representative Agree with the Discharge Plan?  Yes    Fiorella Cruz RN  Case Management Department

## 2023-09-08 NOTE — ED NOTES
Patient ID:  Monique Conrad  [id number]  6/6/1929 87 year old    CHIEF COMPLAINT: Lethargy    ASSESSMENT AND PLAN:  #. Lethargy secondary to new diagnosis of hospital-acquired pneumonia.  -Change antibiotics to IV vancomycin and cefepime.  -Chest physiotherapy  -Nebulizer treatment as needed  -Daily CBC.  #. Pneumonia: Hospital-acquired versus aspiration  -As discussed above  -Soft bland diet  #. Fever: Secondary to pneumonia  -Lactic acid reviewed improving from previous one.  -Blood culture ×2. Pending results  #. Acute UTI. Stable .  #. Hypomagnesemia. Replace and recheck.  #. Hypertension. Resume home medications.  #. Type 2 diabetes. Home medications, sliding scale insulin.  #. Hyperlipidemia: Continue medication  DVT Prophylaxis: Lovenox, SCDs    Discussed with patient today. She is alert oriented ×3 today. Wants to be placed on DO NOT RESUSCITATE status.    Code status: No Resuscitation with Maximal Medical Support      SUBJECTIVE:  Much more alert today. No acute events overnight. Afebrile. Breathing is improved..     OBJECTIVE/EXAM:     Vital Last Value 24 Hour Range   Non-Invasive  Blood Pressure 114/48 (05/22/17 0757) BP  Min: 105/53  Max: 114/48   Pulse 72 (05/22/17 0757) Pulse  Min: 55  Max: 72   Respiratory 20 (05/22/17 0757) Resp  Min: 16  Max: 22   Temperature 98.7 °F (37.1 °C) (05/22/17 0757) Temp  Min: 98.1 °F (36.7 °C)  Max: 99.2 °F (37.3 °C)   Pulse Oximetry 94 % (05/22/17 0757) SpO2  Min: 92 %  Max: 95 %       Intake/Output Summary (Last 24 hours) at 05/22/17 1111  Last data filed at 05/22/17 0900   Gross per 24 hour   Intake          2810.35 ml   Output                0 ml   Net          2810.35 ml       GEN: Alert and oriented ×3.  No acute distress.  HEENT: PERRLA, oral mucous membranes moist.  CV: Regular rate and rhythm.  CHEST: Coarse breath sounds at right lower lobe.  ABDOMEN: Soft, non-tender, non-distended, active bowel sounds.  EXT: No edema, cyanosis or clubbing.  SKIN: Warm and dry.  Pt ambulated to and from restroom without complication.       John Tesfaye RN  09/07/23 8869 No summer.    LABS:    Recent Labs  Lab 05/22/17  0542  05/21/17  0207  05/20/17  1830  05/20/17  0526 05/19/17  1853   WBC  --   --  7.1  --  8.8  --  6.5 6.0   HGB  --   --  13.2  --  14.7  --  13.3 14.0   HCT  --   --  38.2  --  42.2  --  39.0 40.7   PLT  --   --  134*  --  149  --  155 168   SEG  --   --  83  --  88  --   --  82   SODIUM 138  --  137  --   --   --  137 133*   POTASSIUM 3.4  < > 3.4  < >  --   < > 3.7 3.4   CHLORIDE 108*  --  103  --   --   --  102 98   BUN 20  --  18  --   --   --  15 21*   CREATININE 0.63  --  0.60  --   --   --  0.59 0.76   GLUCOSE 113*  --  167*  --   --   --  123* 131*   ALBUMIN  --   --   --   --   --   --   --  3.9   AST  --   --   --   --   --   --   --  23   BILIRUBIN  --   --   --   --   --   --   --  0.7   CPK  --   --   --   --   --   --   --  43   TSH  --   --   --   --   --   --   --  1.055   < > = values in this interval not displayed.      Signed:  Arnel Garibay MD  5/22/2017  11:11 AM

## 2023-09-08 NOTE — PROGRESS NOTES
[x] Medication Reconciliation was completed and the patient's home medication list was verified. The Med List Status is \"Complete\". The following sources were used to assist with Medication Reconciliation:    [] Patient had a list of medications which was transcribed into the EHR. [] Patient provided bottles of their medications    [x] Home medications reviewed and confirmed with patient    [] Contacted patient's pharmacy to confirm home medications    [] Contacted patient's physician office to confirm home medications    [] Medical Records from another facility and/or Care Everywhere were reviewed      [] There are one or more home medications that need clarification before Medication Reconciliation can be completed. The Med List Status has been marked as In Progress. To assist with Home Medication Reconciliation the following actions have been taken:    [] Pharmacy medication reconciliation service requested.  (Note: This can be done by sending a Perfect Serve message to [facility] Pharmacist or by [address] [phone].)  [] Family requested to bring medications into the hospital  [] Family requested to call hospital with medication list  [] Message left with physician office  [] Request for medical records made to   [] Other

## 2023-09-08 NOTE — PROGRESS NOTES
Famotidine changed from IV to PO per [facility] approved policy. Basic Criteria (please refer to hospital policy for details):  1. functioning GI tract  2. tolerating PO/NG routine medications    Thank you.   Jessica Willingham, [facility] 9/8/2023 4:05 PM

## 2023-09-08 NOTE — PROGRESS NOTES
Physical Therapy  Facility/Department: [facility] PROGRESSIVE CARE  Physical Therapy Initial Assessment    Name: Patrick Weller  : 1950  MRN: [id number]  Date of Service: 2023    Discharge Recommendations:  Outpatient PT     Pt presented to ED on 23 with sudden onset numbness and tingling on the right face and right side of the body, with weakness on the right side of the body  while eating dinner approximately around 645. Pt also described some vision changes. Pt has a history of carotid stenosis has been monitored with ultrasounds. Patient has a history of hypertension,'s been controlled on blood pressure medication. Patient noted to be hypertensive here. Patient describes generalized fatigue at this time, describes change in phonation. Patient denies any lightheadedness dizziness, chest pain at this time, did describe some chest pressure/tightness at the onset of symptoms. .      Pt admitted for further medical management stoke like symptoms      RN reports patient is medically stable for therapy treatment this date. Chart reviewed prior to treatment and patient is agreeable for therapy. Patient Diagnosis(es): The encounter diagnosis was TIA (transient ischemic attack). Past Medical History:  has a past medical history of Anesthesia complication, Kidney cysts, Palpitations, Splenic cyst, Tennis elbow, and Thyroid disease. Past Surgical History:  has a past surgical history that includes Hysterectomy (1999); back surgery (06/10/2010); Breast surgery (Right, 1978); Colonoscopy (2014); and Dilation and curettage of uterus. Assessment   Body Structures, Functions, Activity Limitations Requiring Skilled Therapeutic Intervention: Decreased safe awareness;Decreased balance;Decreased endurance  Assessment: Pt with deficits of bed mobility, transfers, ambulation, balance, posture, safety awareness and endurance this session.  Pt requires continued PT to maximize independence minutes for pericare & to pull up brief with Contact Guard Assistance, amb 3ft to sink for hand hygiene x 2 minutes then ambulated 25 more ft  & sat to chair with MIN Assistance     Balance  Posture: Good  Sitting - Static: Good  Sitting - Dynamic: Good  Standing - Static: Good;-  Standing - Dynamic: Fair;+  Single Leg Stance R Le  Single Leg Stance L Le  Circulation/Endurance Exercises: ankle pumps x 20  Pressure Relief Exercises: seated WS  Functional Mobility Circuit Training: sit to stands x 5 with Zulay  Static Sitting Balance Exercises: SEATED EOB & PERFORMED CROSS BODY REACHING TO  SMALL OBJECT FROM FLOOR X 5 EACH SIDE  Dynamic Sitting Balance Exercises: Pt completed static standing WS x 8 with UB support and CGA  Static Standing Balance Exercises: Pt completed standing mini marching x 3 each leg and HIGH GUARD  Dynamic Standing Balance Exercises: Pt completed standing mini marching x 4 each leg with UB support and HIGH GUARD  Postural Correction Exercises: upright posture  Disease-specific Exercises: stroke ED(symptoms, importance to seek quick medical assessment & risk factors/lifestyle changes to reduce future stroke)        OutComes Score  Balance Score: 13 (23)  Gait Score: 8 (23)        Tinetti Total Score: 21 (23)                                   AM-PAC Score  AM-PAC Inpatient Mobility Raw Score : 20 (23 102)  AM-PAC Inpatient T-Scale Score : 47.67 (23 102)  Mobility Inpatient CMS 0-100% Score: 35.83 (23 102)  Mobility Inpatient CMS G-Code Modifier : CJ (23)          Tinneti Score  Balance Score: 13 (23)  Gait Score: 8 (23)  Tinetti Total Score: 21 (230)  Tinetti Disability Index: 20-39% (23)    Goals  Short Term Goals  Time Frame for Short Term Goals: 5 visits  Short Term Goal 1:  Inc transfers to independent to enable pt to safely get in/out of chair from ALL surfaces to return to

## 2023-09-08 NOTE — PROGRESS NOTES
Occupational Therapy  Facility/Department: [facility] PROGRESSIVE CARE  Occupational Therapy Initial Assessment    Name: Qasim Juárez  : 1950  MRN: [id number]  Date of Service: 2023    RUSSELL Landry reports patient is medically stable for therapy treatment this date. Chart reviewed prior to treatment and patient is agreeable for therapy. All lines intact and patient positioned comfortably at end of treatment. All patient needs addressed prior to ending therapy session. Discharge Recommendations:  Patient would benefit from continued therapy after discharge  OT Equipment Recommendations  Equipment Needed: Yes  Mobility Devices: ADL Assistive Devices  ADL Assistive Devices: Long-handled Shoe Horn;Long-handled Sponge       Patient Diagnosis(es): The encounter diagnosis was TIA (transient ischemic attack). Past Medical History:  has a past medical history of Anesthesia complication, Kidney cysts, Palpitations, Splenic cyst, Tennis elbow, and Thyroid disease. Past Surgical History:  has a past surgical history that includes Hysterectomy (1999); back surgery (06/10/2010); Breast surgery (Right, 1978); Colonoscopy (2014); and Dilation and curettage of uterus. PER H&P:    Qasim Juárez is a 72 y.o. female who presents with onset numbness and tingling on the right face and right side of the body, with weakness on the right side of the body. Patient also describes some vision changes. Patient states sudden onset while eating dinner approximately around 645. Patient has a history of carotid stenosis has been monitored with ultrasounds. Patient has a history of hypertension,'s been controlled on blood pressure medication. Patient noted to be hypertensive here. Patient describes generalized fatigue at this time, describes change in phonation. Patient denies any lightheadedness dizziness, chest pain at this time, did describe some chest pressure/tightness at the onset of symptoms. .  Patient has as DME/AE recommendations with use of handouts. Patient Goals   Patient goals : Pt states she wants to get results of MRI and be able to go home!        Therapy Time   Individual Concurrent Group Co-treatment   Time In 0930 (plus 10 min chart review/nursing communication)         Time Out 1034         Minutes 64=74 mins total          Timed Code Treatment Minutes: [address], OT

## 2023-09-08 NOTE — PLAN OF CARE
Problem: Discharge Planning  Goal: Discharge to home or other facility with appropriate resources  9/8/2023 1721 by Augustina Reza RN  Outcome: Progressing  Note: Patient remains free from falls this shift. Patient had MRI brain and ECHO done, results pending. Tolerated therapy session, did complain of dizziness earlier in shift but did resolve. VS stable, home meds reordered. Patient cannot take generic synthroid due to having adverse reactions to it in the past, and that is what is carried and stocked inpatient. Per patient, she will be okay missing today's dose. Complaints of headache and nausea voiced, which were both addressed. Discharge planning pending MRI brain results. Safety measures continued.       Problem: Safety - Adult  Goal: Free from fall injury  9/8/2023 1721 by Augustina Reza RN  Outcome: Progressing     Problem: ABCDS Injury Assessment  Goal: Absence of physical injury  9/8/2023 1721 by Augustina Reza RN  Outcome: Progressing     Problem: Cardiovascular - Adult  Goal: Maintains optimal cardiac output and hemodynamic stability  9/8/2023 1701 by Augustina Reza RN  Outcome: Progressing     Problem: Musculoskeletal - Adult  Goal: Return mobility to safest level of function  9/8/2023 1701 by Augustina Reza RN  Outcome: Progressing

## 2023-09-08 NOTE — PROGRESS NOTES
Admitted from ER  to room 1010-2. Pt alert and oriented. Pt oriented to call light system and agreeable to call for assistance. Vital signs recorded    Telemetry monitor applied.    Admission documentation completed  Home Meds

## 2023-09-09 VITALS
BODY MASS INDEX: 25.83 KG/M2 | DIASTOLIC BLOOD PRESSURE: 77 MMHG | TEMPERATURE: 97.7 F | OXYGEN SATURATION: 97 % | SYSTOLIC BLOOD PRESSURE: 132 MMHG | HEIGHT: 65 IN | RESPIRATION RATE: 18 BRPM | WEIGHT: 155 LBS | HEART RATE: 73 BPM

## 2023-09-09 PROBLEM — I63.9 CEREBRAL INFARCTION (HCC): Status: ACTIVE | Noted: 2023-09-09

## 2023-09-09 LAB
ANION GAP SERPL CALCULATED.3IONS-SCNC: 11 MMOL/L (ref 9–17)
BASOPHILS # BLD: <0.03 K/UL (ref 0–0.2)
BASOPHILS NFR BLD: 0 % (ref 0–2)
BUN SERPL-MCNC: 16 MG/DL (ref 8–23)
BUN/CREAT SERPL: 27 (ref 9–20)
CALCIUM SERPL-MCNC: 9.4 MG/DL (ref 8.6–10.4)
CHLORIDE SERPL-SCNC: 104 MMOL/L (ref 98–107)
CO2 SERPL-SCNC: 23 MMOL/L (ref 20–31)
CREAT SERPL-MCNC: 0.6 MG/DL (ref 0.5–0.9)
EOSINOPHIL # BLD: 0.07 K/UL (ref 0–0.44)
EOSINOPHILS RELATIVE PERCENT: 1 % (ref 1–4)
ERYTHROCYTE [DISTWIDTH] IN BLOOD BY AUTOMATED COUNT: 13.3 % (ref 11.8–14.4)
GFR SERPL CREATININE-BSD FRML MDRD: >60 ML/MIN/1.73M2
GLUCOSE SERPL-MCNC: 94 MG/DL (ref 70–99)
HCT VFR BLD AUTO: 42.8 % (ref 36.3–47.1)
HGB BLD-MCNC: 13.8 G/DL (ref 11.9–15.1)
IMM GRANULOCYTES # BLD AUTO: 0.02 K/UL (ref 0–0.3)
IMM GRANULOCYTES NFR BLD: 0 %
LYMPHOCYTES NFR BLD: 1.95 K/UL (ref 1.1–3.7)
LYMPHOCYTES RELATIVE PERCENT: 32 % (ref 24–43)
MCH RBC QN AUTO: 28.8 PG (ref 25.2–33.5)
MCHC RBC AUTO-ENTMCNC: 32.2 G/DL (ref 28.4–34.8)
MCV RBC AUTO: 89.2 FL (ref 82.6–102.9)
MONOCYTES NFR BLD: 0.45 K/UL (ref 0.1–1.2)
MONOCYTES NFR BLD: 7 % (ref 3–12)
NEUTROPHILS NFR BLD: 60 % (ref 36–65)
NEUTS SEG NFR BLD: 3.69 K/UL (ref 1.5–8.1)
NRBC BLD-RTO: 0 PER 100 WBC
PLATELET # BLD AUTO: 165 K/UL (ref 138–453)
PMV BLD AUTO: 10.5 FL (ref 8.1–13.5)
POTASSIUM SERPL-SCNC: 4 MMOL/L (ref 3.7–5.3)
RBC # BLD AUTO: 4.8 M/UL (ref 3.95–5.11)
SODIUM SERPL-SCNC: 138 MMOL/L (ref 135–144)
WBC OTHER # BLD: 6.2 K/UL (ref 3.5–11.3)

## 2023-09-09 PROCEDURE — 99232 SBSQ HOSP IP/OBS MODERATE 35: CPT | Performed by: PSYCHIATRY & NEUROLOGY

## 2023-09-09 PROCEDURE — 2580000003 HC RX 258: Performed by: NURSE PRACTITIONER

## 2023-09-09 PROCEDURE — 36415 COLL VENOUS BLD VENIPUNCTURE: CPT

## 2023-09-09 PROCEDURE — 6370000000 HC RX 637 (ALT 250 FOR IP): Performed by: PSYCHIATRY & NEUROLOGY

## 2023-09-09 PROCEDURE — 85025 COMPLETE CBC W/AUTO DIFF WBC: CPT

## 2023-09-09 PROCEDURE — 80048 BASIC METABOLIC PNL TOTAL CA: CPT

## 2023-09-09 PROCEDURE — 6370000000 HC RX 637 (ALT 250 FOR IP): Performed by: FAMILY MEDICINE

## 2023-09-09 RX ORDER — CLOPIDOGREL BISULFATE 75 MG/1
75 TABLET ORAL DAILY
Qty: 30 TABLET | Refills: 3 | Status: SHIPPED | OUTPATIENT
Start: 2023-09-10

## 2023-09-09 RX ORDER — ATORVASTATIN CALCIUM 80 MG/1
80 TABLET, FILM COATED ORAL NIGHTLY
Qty: 30 TABLET | Refills: 3 | Status: SHIPPED | OUTPATIENT
Start: 2023-09-09

## 2023-09-09 RX ORDER — ASPIRIN 81 MG/1
81 TABLET, CHEWABLE ORAL DAILY
Qty: 21 TABLET | Refills: 0 | Status: SHIPPED | OUTPATIENT
Start: 2023-09-10 | End: 2023-10-01

## 2023-09-09 RX ADMIN — SODIUM CHLORIDE, PRESERVATIVE FREE 10 ML: 5 INJECTION INTRAVENOUS at 10:03

## 2023-09-09 RX ADMIN — ASPIRIN 81 MG CHEWABLE TABLET 81 MG: 81 TABLET CHEWABLE at 10:03

## 2023-09-09 RX ADMIN — AMLODIPINE BESYLATE 2.5 MG: 2.5 TABLET ORAL at 10:03

## 2023-09-09 RX ADMIN — CLOPIDOGREL BISULFATE 75 MG: 75 TABLET ORAL at 10:03

## 2023-09-09 NOTE — DISCHARGE SUMMARY
[address] [place] [address],    Adult Hospitalist      Patient ID: Patrick Weller  MRN: [id number]     Acct:  [de-identified]       Patient's PCP: Christine Mcintosh MD    Admit Date: 9/7/2023     Discharge Date:   9/9/2023    Admitting Physician: Kennedi Salinas MD    Discharge Physician: Merlene Oscar MD     CONSULTANTS: Patient Care Team:  Christine Mcintosh MD as PCP - General (Family Medicine)        Active Discharge Diagnoses:  Transient ischemic attack  Right-sided facial numbness/tingling/weakness  Hypertension  Hyperlipidemia      Hospital Course:   Patrick Weller is a 72 y.o.  female who presents with Hypertension and Extremity Weakness (Pt to ED with complaints on numbness/tingling to right side of face and weakness to the lower extremities. Pt states that symptoms started about 45 minutes PTA while she was eating dinner.  Pt denies any chest pain or SOB. )  Patient is 79-year-old female been admitted to emergency room, patient came to the ER with a creatinine right-sided facial numbness and tingling, also has numbness and tingling of the right side of the body, patient has some associated weakness with it on the right side of activities, patient was eating dinner around approximately 6:45 PM with symptoms started, patient has a past medical history significant for carotid stenosis, also have history of uncontrolled hypertension, patient endorses emergency room was unremarkable, admitted for further management, patient was admitted for transient ischemic attack, also noticed to have right-sided facial numbness tingling and weakness secondary to that which resolved by the time patient was admitted to the floor, further patient underwent echocardiogram which showed ejection fraction of 60 to 65% with normal valves, LDL was 50, triglycerides 151, cholesterol 160, hemoglobin A1c 6, patient is started on Lipitor 80 mg daily, also continued on aspirin 81  and started on Plavix, MRI brain

## 2023-09-09 NOTE — PLAN OF CARE
Patient's symptoms have resolved   Okay for discharge from neurology.  To follow up with them outpatient        Problem: Discharge Planning  Goal: Discharge to home or other facility with appropriate resources  9/9/2023 1533 by Natalie Myles RN  Outcome: Progressing     Problem: Safety - Adult  Goal: Free from fall injury  9/9/2023 1533 by Natalie Myles RN  Outcome: Progressing     Problem: ABCDS Injury Assessment  Goal: Absence of physical injury  9/9/2023 1533 by Natalie Myles RN  Outcome: Progressing     Problem: Neurosensory - Adult  Goal: Remains free of injury related to seizures activity  9/9/2023 1533 by Natalie Myles RN  Outcome: Progressing     Problem: Cardiovascular - Adult  Goal: Maintains optimal cardiac output and hemodynamic stability  9/9/2023 1533 by Natalie Myles RN  Outcome: Progressing     Problem: Musculoskeletal - Adult  Goal: Return mobility to safest level of function  9/9/2023 1533 by Natalie Myles RN  Outcome: Progressing     Problem: Musculoskeletal - Adult  Goal: Maintain proper alignment of affected body part  9/9/2023 1533 by Natalie Myles RN  Outcome: Progressing     Problem: Musculoskeletal - Adult  Goal: Return ADL status to a safe level of function  9/9/2023 1533 by Natalie Myles RN  Outcome: Progressing

## 2023-09-09 NOTE — PLAN OF CARE
Patient A&O x4 and ambulates as a standby to the bathroom  NIHSS Qshift, this shift was 0  Vitals WDL  Echo done yesterday showed 60-65%  MRI of Brain completed (see chart for details)  Patients will return home upon dc planning  Care ongoing      Problem: Discharge Planning  Goal: Discharge to home or other facility with appropriate resources  9/9/2023 0312 by Shannon Madison RN  Outcome: Progressing     Problem: Safety - Adult  Goal: Free from fall injury  9/9/2023 0312 by Shannon Madison RN  Outcome: Progressing     Problem: ABCDS Injury Assessment  Goal: Absence of physical injury  9/9/2023 1385 by Shannon Madison RN  Outcome: Progressing     Problem: Cardiovascular - Adult  Goal: Maintains optimal cardiac output and hemodynamic stability  9/9/2023 0312 by Shannon Madison RN  Outcome: Progressing     Problem: Musculoskeletal - Adult  Goal: Return mobility to safest level of function  9/9/2023 0312 by Shannon Madison RN  Outcome: Progressing

## 2023-09-09 NOTE — PROGRESS NOTES
Active left cerebral infarction with transient right face arm and leg numbness with leg weakness . The condition is MRI of Head with two areas of acute ischemia left parietal cortex . Cardiac 2 D echo normal LVF EF 60-65 % . Normal valves . She is on  plavix 75 mg , aspirin 81 mg po qd x 3 weeks then plavix 75 mg po qd , lipitor 80 mg po qd . She is walking 50 feet with no device in PT . She is alert and oriented with no focal motor ,sensory , bulbar or visual complaints . 73 yo lady with right face arm and leg numbness with leg weakness. She while eating dinner yesterday evening developed numbness of right dace and entire right body arm and leg reporting leg weakness of right leg with giveway on weight bearing  lasting for 45 minutes resolved in ER NIH 0 . She has history of HTN and carotid stenosis on aspirin . Blood pressure in /86 . EKG NSR . Head CT normal . CTA head and neck with no large vessel occlusion . She denies headache . She was loaded with aspirin 324 mg and plavix 300 mg in ER . She reports to have history of migraines currently having mild frontal head pressure with visual aura every 2 weeks using excedrin . She does take aspirin 81 mg po qd and lipitor 80 mg po qd . Significant medications aspirin 81 mg po qd , lipitor 80 mg po qd. Testing  Head CT normal . CTA head and neck with no large vessel occlusion . MRI of Head with two areas of acute ischemia left parietal cortex . Cardiac 2 D echo normal LVF EF 60-65 % . Normal valves . Cholesterol 160 , LDL 50 , TG 51, Hga1c 6.0      Past Medical History:   Diagnosis Date    Anesthesia complication 87/72/8322    was told after her hysterectomy that \"they almost lost her\" d/t an anesthesia complication, but never told her what it was, has had anes.  since without any problems    Kidney cysts 03/2019    Palpitations     recently d/t the heat (pt's air conditioner isn't working) written 7/2/2019    Splenic cyst 03/2019    Tennis elbow     left [place] Flies Breathsounds clear bilaterally  Cardiovascular: Auscultation of heart with regular rate and rhythm   Musculoskeletal. Muscle bulk and tone normal                                                           Muscle strength 5/5 strength throughout                                                                                No dysmetria or dysdiadokinesis  No tremor   Normal fine motor  Orientation Alert and oriented x 3   Attention and concentration normal  Short term memory normal  Language process and speech normal . No aphasia   Cranial nerve 2 normal acuety and visual fields  Cranial nerve 3, 4 and 6 . Extraocular muscles are intact . Pupils are equal and reactive   Cranial nerve 5  Intact corneal reflex. Normal facial sensation  Cranial nerve 7 normal exam   Cranial nerve 8. Grossly intact hearing   Cranial nerve 9 and 10. Symmetric palate elevation   Cranial nerve 11 , 5 out of 5 strength   Cranial Nerve 12 midline tongue . No atrophy  Sensation . Normal pinprick and light touch   Deep Tendon Reflexes normal  Plantar response flexor bilaterally    Assessment :    Left cerebral infarction     Plan:  Okay to discharge on plavix 75 mg , aspirin 81 mg po qd x 3 weeks then plavix 75 mg po qd , lipitor 80 mg po qd . My office will arrange for outpatient KERRY and loop recorder .  FU in office in 4 weeks

## 2023-09-09 NOTE — PROGRESS NOTES
Pt ambulatory during discharged. Patient discharged via private auto with family member, brother. Discharge paperwork and instructions given to patient. Patient  acknowledges understanding. Scripts given to Patient for asa, lipitor, and plavix. (New medications and side effects explained. Directions to make follow up appointment given  Discharge checklist completed   No HAZEL needed     Any questions answered.      Telemetry monitor returned

## 2023-09-12 ENCOUNTER — TELEPHONE (OUTPATIENT)
Dept: NEUROLOGY | Age: 73
End: 2023-09-12

## 2023-09-12 DIAGNOSIS — I63.9 CEREBRAL INFARCTION, UNSPECIFIED MECHANISM (HCC): Primary | ICD-10-CM

## 2023-09-12 DIAGNOSIS — R93.89 ABNORMAL FINDINGS ON DIAGNOSTIC IMAGING OF OTHER SPECIFIED BODY STRUCTURES: ICD-10-CM

## 2023-09-12 NOTE — TELEPHONE ENCOUNTER
Mrs. Zev Betts called the office this morning and this information was given. Patient stated that she already sees cardiologist Dr. Stone Hernandez ([phone]). Writer advised that I would check with their office to see if they do the KERRY and loop recorder and call her back. Call placed to Dr. Perry Najera office and this was discussed with Ludmila Latham. She stated that they don't do the testing, that they suggest sending the orders to [facility]. Call placed back to the patient and this information was given. Daryl Castillo stated that she would call Dr. Perry Najera office to see if he was affiliated with [facility] and would call back. Patient called back stating that Dr. Stone Hernandez doesn't go to [facility] and she would like to have the orders sent to [facility]. Writer advised that the orders would be faxed and they would contact her to schedule. Writer did contact [facility] to confirm their fax. Referral , KERRY order, LOOP recordedr order and 9/7/23 note successfully faxed to [phone]. Mrs. Zev Betts also requested a return to work note. Patient stated that she would like to go back tomorrow. 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 This was discussed with Dr. Arianna Hagen and he felt that she could go back tomorrow, and going through the training would be fine. Letter generated and signed by Dr. Arianna Hagen. Call placed back to the patient to inform that the letter was ready. Patient stated that she would pick this up from the office. Writer advised that the information would be at the . Writer advised that I would also include the information to [facility].

## 2023-09-21 NOTE — PROGRESS NOTES
Physician Progress Note      PATIENT:               Hernán Jones  CSN #:                  [id number]  :                       1950  ADMIT DATE:       2023 7:13 PM  [address] [facility] DATE:        2023 5:09 PM  RESPONDING  PROVIDER #:        Margret Zhao MD          QUERY TEXT:    Patient admitted with R sided facial numbness/tingling/weakness. Noted   documentation of \"Active left cerebral infarction with transient right face   arm and leg numbness with leg weakness\" in  Neuro PN and \"TIA\" in  D/C   Summary. If possible, please document in progress notes and discharge summary   if you are evaluating and /or treating any of the following: The medical record reflects the following:  Risk Factors: PMH o f HTN and HLE, presents with HTN and extremity weakness &   numbness/tingling to R side of face. Clinical Indicators: Neuro PN : Active left cerebral infarction with   transient right face arm and leg numbness with leg weakness. MRI of Head with   two areas of acute ischemia left parietal cortex. Left cerebral infarction. D/C Summary : Transient ischemic attack. Right-sided facial   numbness/tingling/weakness. patient was admitted for transient ischemic   attack, also noticed to have right-sided facial numbness tingling and weakness   secondary to that which resolved by the time patient was admitted to the   floor. MRI brain showed 2 areas of acute ischemia left parietal yandel  Treatment: Neuro consult. Discharged home on Plavix 75 mg , aspirin 81 mg po   qd x 3 weeks then Plavix 75 mg po qd , Lipitor 80 mg po qd with instructions   to follow up with Neuro o/p.     Thank Brendan Escalante RN, CDS  Options provided:  -- L cerebral infarction confirmed and TIA ruled out  -- TIA confirmed and L cerebral infarction ruled out  -- Other - I will add my own diagnosis  -- Disagree - Not applicable / Not valid  -- Disagree - Clinically unable to determine / Unknown  -- Refer to Clinical Documentation

## 2023-10-04 ENCOUNTER — TELEPHONE (OUTPATIENT)
Dept: NEUROLOGY | Age: 73
End: 2023-10-04

## 2023-10-04 NOTE — TELEPHONE ENCOUNTER
Patient called in stating that ever since she had been started on the Plavix 75mg daily she has been feeling extremely exhausted. Please advise.

## 2023-10-05 NOTE — TELEPHONE ENCOUNTER
Message to nurse . I am oit sure fatigue is from plavix .  Would have her change her dosing to one tablet at bedtime

## 2023-10-05 NOTE — TELEPHONE ENCOUNTER
Patient called back in, and she states that she has been experiencing quite a bit bruising as a side effect as well.

## 2023-10-06 ENCOUNTER — HOSPITAL ENCOUNTER (OUTPATIENT)
Age: 73
Setting detail: OUTPATIENT SURGERY
End: 2023-10-06
Attending: PSYCHIATRY & NEUROLOGY
Payer: MEDICARE

## 2023-10-06 ENCOUNTER — OFFICE VISIT (OUTPATIENT)
Dept: OPERATING ROOM | Age: 73
End: 2023-10-06
Attending: INTERNAL MEDICINE

## 2023-10-06 VITALS
DIASTOLIC BLOOD PRESSURE: 64 MMHG | OXYGEN SATURATION: 97 % | HEART RATE: 84 BPM | SYSTOLIC BLOOD PRESSURE: 145 MMHG | RESPIRATION RATE: 17 BRPM

## 2023-10-06 DIAGNOSIS — G45.9 TIA (TRANSIENT ISCHEMIC ATTACK): ICD-10-CM

## 2023-10-06 DIAGNOSIS — R93.89 ABNORMAL FINDINGS ON DIAGNOSTIC IMAGING OF OTHER SPECIFIED BODY STRUCTURES: ICD-10-CM

## 2023-10-06 DIAGNOSIS — I63.9 CEREBRAL INFARCTION, UNSPECIFIED MECHANISM (HCC): ICD-10-CM

## 2023-10-06 DIAGNOSIS — I63.81 CEREBRAL INFARCTION DUE TO OCCLUSION OF SMALL ARTERY (HCC): Primary | ICD-10-CM

## 2023-10-06 DIAGNOSIS — R29.90 STROKE-LIKE SYMPTOM: ICD-10-CM

## 2023-10-06 PROCEDURE — 6360000002 HC RX W HCPCS: Performed by: STUDENT IN AN ORGANIZED HEALTH CARE EDUCATION/TRAINING PROGRAM

## 2023-10-06 PROCEDURE — 93285 PRGRMG DEV EVAL SCRMS IP: CPT | Performed by: INTERNAL MEDICINE

## 2023-10-06 PROCEDURE — 93325 DOPPLER ECHO COLOR FLOW MAPG: CPT

## 2023-10-06 PROCEDURE — 93325 DOPPLER ECHO COLOR FLOW MAPG: CPT | Performed by: INTERNAL MEDICINE

## 2023-10-06 PROCEDURE — 6370000000 HC RX 637 (ALT 250 FOR IP): Performed by: STUDENT IN AN ORGANIZED HEALTH CARE EDUCATION/TRAINING PROGRAM

## 2023-10-06 PROCEDURE — 93312 ECHO TRANSESOPHAGEAL: CPT | Performed by: INTERNAL MEDICINE

## 2023-10-06 PROCEDURE — 6360000002 HC RX W HCPCS: Performed by: INTERNAL MEDICINE

## 2023-10-06 PROCEDURE — 2580000003 HC RX 258: Performed by: PSYCHIATRY & NEUROLOGY

## 2023-10-06 PROCEDURE — 2500000003 HC RX 250 WO HCPCS: Performed by: STUDENT IN AN ORGANIZED HEALTH CARE EDUCATION/TRAINING PROGRAM

## 2023-10-06 RX ORDER — LIDOCAINE HYDROCHLORIDE 20 MG/ML
SOLUTION OROPHARYNGEAL PRN
Status: COMPLETED | OUTPATIENT
Start: 2023-10-06 | End: 2023-10-06

## 2023-10-06 RX ORDER — SODIUM CHLORIDE 9 MG/ML
INJECTION, SOLUTION INTRAVENOUS CONTINUOUS
Status: DISCONTINUED | OUTPATIENT
Start: 2023-10-06 | End: 2023-10-09 | Stop reason: HOSPADM

## 2023-10-06 RX ORDER — FENTANYL CITRATE 50 UG/ML
INJECTION, SOLUTION INTRAMUSCULAR; INTRAVENOUS PRN
Status: COMPLETED | OUTPATIENT
Start: 2023-10-06 | End: 2023-10-06

## 2023-10-06 RX ORDER — MIDAZOLAM HYDROCHLORIDE 1 MG/ML
INJECTION INTRAMUSCULAR; INTRAVENOUS PRN
Status: COMPLETED | OUTPATIENT
Start: 2023-10-06 | End: 2023-10-06

## 2023-10-06 RX ADMIN — SODIUM CHLORIDE: 9 INJECTION, SOLUTION INTRAVENOUS at 09:37

## 2023-10-06 RX ADMIN — BENZOCAINE, BUTAMBEN, AND TETRACAINE HYDROCHLORIDE 3 SPRAY: .028; .004; .004 AEROSOL, SPRAY TOPICAL at 12:04

## 2023-10-06 RX ADMIN — LIDOCAINE HYDROCHLORIDE 15 ML: 20 SOLUTION ORAL; TOPICAL at 12:04

## 2023-10-06 RX ADMIN — FENTANYL CITRATE 50 MCG: 50 INJECTION, SOLUTION INTRAMUSCULAR; INTRAVENOUS at 12:05

## 2023-10-06 RX ADMIN — LIDOCAINE HYDROCHLORIDE 10 ML: 10 INJECTION, SOLUTION EPIDURAL; INFILTRATION; INTRACAUDAL; PERINEURAL at 12:18

## 2023-10-06 RX ADMIN — MIDAZOLAM 2 MG: 1 INJECTION INTRAMUSCULAR; INTRAVENOUS at 12:03

## 2023-10-06 NOTE — PROGRESS NOTES
TRANSFER - OUT REPORT:    Verbal report given to frederick De Leon being transferred to [facility]  for routine post-op       Report consisted of patient's Situation, Background, Assessment and   Recommendations(SBAR). Information from the following report(s) Nurse Handoff Report was reviewed with the receiving nurse. Opportunity for questions and clarification was provided.       Patient transported with:   [id number]

## 2023-10-06 NOTE — PROCEDURES
[facility]  Transesophageal Echocardiogram       Today's Date:  10/6/2023  Ordering Physician:  Dr. Irvin Juarez  Indication:   CVA    Operators:  Primary:   Booker Coyne MD   Assistant:   Elyssa Solo MD (Cardiovascular Fellow)    Pre Procedure Conscious Sedation Data:  ASA Class:    [] I [x] II [] III [] IV    Mallampati Class:  [] I [x] II [] III [] IV    Procedure:  Patient seen and examined. History and Physical reviewed. Labs reviewed. After informed consent was obtained with explanation of the risks and benefits, the patient was brought to cardiac cath lab. All sedation was administered by the cardiologist. The oropharynx was pre-anesthesized with viscous lidocaine and cetacaine spray. The ultrasound probe was passed without any difficulty. KERRY findings:  LA:  Normal  ARIADNE:  No thrombus  RA:  Normal  RV:   Normal  LV:  Normal  Estimated LVEF:  55%  Aorta:   Mild atheromatous disease arch  Pericardium: No pericardial effusion  Septum:  No intracardiac shunt via color Doppler. No intracardiac shunt via injection of agitated saline. Valves:  Mitral Valve: Structurally normal. No regurgitation is identified. Aortic Valve: The aortic valve is trileaflet and opens adequately. No regurgitiation is identified. Tricuspid valve: Structurally normal. No regurgitation is identified. Pulmonary valve: Normal. No significant regurgitation    No valvular vegetations or thrombus identified. Summary:   1. A KERRY was performed without complications. 2. LVEF 55%  3. No thrombus or valvular vegetation identified  4. Negative bubble study for PFO.        Elyssa Solo, PGY- 4  Fellow, cardiovascular disease   [place], [place]  10/6/2023, 12:34 PM

## 2023-10-06 NOTE — PROCEDURES
[facility]      Procedure Note  LOOP RECORDER IMPLANT      Lul Broderick (3 y.o., female)  1950  10/6/2023    Procedure: Loop Recorder Implant    Operators:  Primary: Dr. Jose Chiu MD       Model # Serial #   Recorder [id number]     Sedation monitoring  Loop recorder Implant    Indication: CVA    Procedure:  After the usual preparation of the left neck and chest, the patient was draped in the usual sterile manner. Local anesthesia was infused below the left clavicle from the midline laterally. An incision was made lateral to midline. The incision was dilated. The Loop recorder (Reveal) System was advanced using the standard techniques, and positioned successfully with no bleeding or compliaction    Impression / Device:  Successful Implantation of Loop Recporder    Plan:  Interrogate recorder before discharge  Wound check at [facility] in 7 days      Keena Alexis MD, PGY- 4  Fellow, cardiovascular disease   [place], [place]  10/6/2023, 12:35 PM     Attending Physician Statement  I have discussed the care of Lul Broderick, including pertinent history and exam findings,  with the cardiology fellow/resident. I have seen and examined the patient and the key elements of all parts of the encounter have been performed by me. I have completed at least one if not all key elements of the E/M (history, physical exam, and MDM). I agree with the assessment, plan and orders as documented by the resident .        Vanesa Brown MD, [facility]

## 2023-10-06 NOTE — DISCHARGE INSTRUCTIONS
TRANSESOPHAGEAL ECHOCARDIOGRAM / KERRY DISCHARGE INSTRUCTIONS    If the following occurs call your physician or seek help    -trouble with swallowing    -spitting or coughing up blood    -severe pain in the throat region / your throat can be sore for several days but pain should not increase as days continue    Do not drive or operate heavy machinery today due to sedation            Transesophageal Echocardiogram:  What is a transesophageal echocardiogram?     A transesophageal echocardiogram is a test to help your doctor look at the inside of your heart. A small device called a transducer directs sound waves toward your heart. The sound waves make a picture of the heart's valves and chambers. Your doctor may do this test to look for certain types of heart disease. Or it may be done to see how disease is affecting your heart. You will be given medicine to make you sleepy and comfortable during the test.  The doctor puts a small, flexible tube into your throat and guides it to the esophagus. This is the tube that connects your mouth to your stomach. The doctor will ask you to swallow as the tube goes down. The transducer is at the tip of the tube. It gets close to your heart to make clear pictures. The doctor will look at the ultrasound pictures on a screen. You will not be able to eat or drink until the numbness from the throat spray wears off. Your throat may be sore for a few days after the test.  Follow-up care is a key part of your treatment and safety. Be sure to make and go to all appointments, and call your doctor if you are having problems. It's also a good idea to know your test results and keep a list of your current medications. Going home  Be sure you have someone to drive you home. Anesthesia and pain medicine make it unsafe for you to drive. Where can you learn more? Go to https://erika.[employer]. org and sign in to your [employer] account.  Enter K500 in the [employer]

## 2023-10-06 NOTE — H&P
[facility]  Transesophageal Echocardiogram       Today's Date:  10/6/2023  Ordering Physician:  Dr. Mita Pathak  Indication:   CVA    Operators:  Primary:   Dr. Aleida Faria MD, Zurdo Quiroz MD, and Pati Sotelo MD (Attending Physician)  Assistant:   Leetta Leyden, MD (Cardiovascular Fellow)    Pre Procedure Conscious Sedation Data:  ASA Class:    [] I [x] II [] III [] IV    Mallampati Class:  [] I [x] II [] III [] IV    Procedure:  Patient seen and examined. History and Physical reviewed. Labs reviewed. After informed consent was obtained with explanation of the risks and benefits, the patient was brought to cardiac cath lab. All sedation was administered by the cardiologist. The oropharynx was pre-anesthesized with viscous lidocaine and cetacaine spray. The ultrasound probe was passed without any difficulty. KERRY findings:  LA:  Normal  ARIADNE:  No thrombus  RA:  Normal  RV:   Normal  LV:  Normal  Estimated LVEF:  55%  Aorta:   Mild atheromatous disease arch  Pericardium: No pericardial effusion  Septum:  No intracardiac shunt via color Doppler. No intracardiac shunt via injection of agitated saline. Valves:  Mitral Valve: Structurally normal. No regurgitation is identified. Aortic Valve: The aortic valve is trileaflet and opens adequately. No regurgitiation is identified. Tricuspid valve: Structurally normal. No regurgitation is identified. Pulmonary valve: Normal. No significant regurgitation    No valvular vegetations or thrombus identified. Summary:   1. A KERRY was performed without complications. 2. LVEF 55%  3.  No thrombus or valvular vegetation identified      Leetta Leyden, PGY- 4  Fellow, cardiovascular disease   [facility], [facility]  10/6/2023, 12:32 PM

## 2023-10-06 NOTE — H&P
Bactrim [sulfamethoxazole-trimethoprim]    Social History:   reports that she has never smoked. She has never used smokeless tobacco. She reports current alcohol use. She reports that she does not use drugs. Family History: family history is not on file. REVIEW OF SYSTEMS:    Constitutional: Negative for fatigue, weight loss, loss of appetite   Cardiovascular: as per HPI  Respiratory: as per HPI  Gastrointestinal: Negative for abdominal pain, N/V  Genitourinary: No dysuria, trouble voiding, or hematuria. Musculoskeletal:  No gait disturbance, No weakness or joint complaints. Neurological: No headache, diplopia, change in muscle strength, numbness or tingling. No change in gate. Endocrine: No temperature intolerance. No excessive thirst, fluid intake, or urination. No tremor. Hematologic/Lymphatic: No abnormal bruising or bleeding    PHYSICAL EXAM:      There were no vitals taken for this visit. Constitutional and General Appearance: alert, cooperative, in no distress   HEENT: atraumatic, normocephalic. Respiratory:  Clear to auscultation bilaterally  Cardiovascular:  Regular S1 and S2. No JVD  Peripheral pulses are symmetrical and full   Abdomen:   Soft, non tender   Bowel sounds present  Extremities:  No Le edema or cyanosis   Neurological:  Deferred     LABS:   CBC: No results for input(s): \"WBC\", \"HGB\", \"HCT\", \"PLT\" in the last 72 hours. BMP: No results for input(s): \"NA\", \"K\", \"CO2\", \"BUN\", \"CREATININE\", \"LABGLOM\", \"GLUCOSE\" in the last 72 hours. BNP: No results for input(s): \"BNP\" in the last 72 hours. PT/INR: No results for input(s): \"PROTIME\", \"INR\" in the last 72 hours. APTT:No results for input(s): \"APTT\" in the last 72 hours. CARDIAC ENZYMES:No results for input(s): \"CKTOTAL\", \"CKMB\", \"CKMBINDEX\", \"TROPONINI\" in the last 72 hours.     DATA:    EKG:   Normal sinus rhythm, no acute ST-T wave changes     ECHO: 09/08/2023  Left Ventricle Normal left ventricular systolic function with a Physician Statement  I have discussed the care of Hernán Crimes, including pertinent history and exam findings,  with the cardiology fellow/resident. I have seen and examined the patient and the key elements of all parts of the encounter have been performed by me. I have completed at least one if not all key elements of the E/M (history, physical exam, and MDM). I agree with the assessment, plan and orders as documented by the resident.        Chelsy Morgan MD, [facility]

## 2023-10-06 NOTE — PROGRESS NOTES
Received post procedure to [facility] to room 3. Assessment obtained. Restrictions reviewed with patient. Post procedure pathway initiated. Left chest site soft, clean dry and intact. No hematoma noted. Family at side. Patient without complaints.

## 2023-10-06 NOTE — TELEPHONE ENCOUNTER
Patient called back returning Barbie's call. Writer did read through the note and relayed to the patient to start taking the Plavix at bedtime instead of in the morning. Writer asked the patient to report back on Monday, October 9, 2023, to see if taking the Plavix at bedtime helped with her fatigue.

## 2023-12-14 PROBLEM — Z98.890 HISTORY OF LOOP RECORDER: Status: ACTIVE | Noted: 2023-12-14

## 2023-12-14 PROBLEM — I10 BENIGN ESSENTIAL HTN: Status: ACTIVE | Noted: 2023-12-14

## 2024-01-17 ENCOUNTER — OFFICE VISIT (OUTPATIENT)
Dept: NEUROLOGY | Age: 74
End: 2024-01-17
Payer: MEDICARE

## 2024-01-17 VITALS
HEIGHT: 65 IN | SYSTOLIC BLOOD PRESSURE: 121 MMHG | BODY MASS INDEX: 26.09 KG/M2 | DIASTOLIC BLOOD PRESSURE: 74 MMHG | WEIGHT: 156.6 LBS | HEART RATE: 78 BPM

## 2024-01-17 DIAGNOSIS — G43.109 MIGRAINE WITH AURA AND WITHOUT STATUS MIGRAINOSUS, NOT INTRACTABLE: ICD-10-CM

## 2024-01-17 DIAGNOSIS — I63.9 CEREBRAL INFARCTION, UNSPECIFIED MECHANISM (HCC): Primary | ICD-10-CM

## 2024-01-17 PROCEDURE — G8419 CALC BMI OUT NRM PARAM NOF/U: HCPCS | Performed by: PSYCHIATRY & NEUROLOGY

## 2024-01-17 PROCEDURE — 3078F DIAST BP <80 MM HG: CPT | Performed by: PSYCHIATRY & NEUROLOGY

## 2024-01-17 PROCEDURE — 1036F TOBACCO NON-USER: CPT | Performed by: PSYCHIATRY & NEUROLOGY

## 2024-01-17 PROCEDURE — 99214 OFFICE O/P EST MOD 30 MIN: CPT | Performed by: PSYCHIATRY & NEUROLOGY

## 2024-01-17 PROCEDURE — 1123F ACP DISCUSS/DSCN MKR DOCD: CPT | Performed by: PSYCHIATRY & NEUROLOGY

## 2024-01-17 PROCEDURE — 3017F COLORECTAL CA SCREEN DOC REV: CPT | Performed by: PSYCHIATRY & NEUROLOGY

## 2024-01-17 PROCEDURE — G8484 FLU IMMUNIZE NO ADMIN: HCPCS | Performed by: PSYCHIATRY & NEUROLOGY

## 2024-01-17 PROCEDURE — G8427 DOCREV CUR MEDS BY ELIG CLIN: HCPCS | Performed by: PSYCHIATRY & NEUROLOGY

## 2024-01-17 PROCEDURE — 1090F PRES/ABSN URINE INCON ASSESS: CPT | Performed by: PSYCHIATRY & NEUROLOGY

## 2024-01-17 PROCEDURE — G8400 PT W/DXA NO RESULTS DOC: HCPCS | Performed by: PSYCHIATRY & NEUROLOGY

## 2024-01-17 PROCEDURE — 3074F SYST BP LT 130 MM HG: CPT | Performed by: PSYCHIATRY & NEUROLOGY

## 2024-01-17 ASSESSMENT — ENCOUNTER SYMPTOMS
EYES NEGATIVE: 1
GASTROINTESTINAL NEGATIVE: 1
ALLERGIC/IMMUNOLOGIC NEGATIVE: 1
RESPIRATORY NEGATIVE: 1

## 2024-12-27 ENCOUNTER — APPOINTMENT (OUTPATIENT)
Dept: GENERAL RADIOLOGY | Age: 74
End: 2024-12-27
Payer: MEDICARE

## 2024-12-27 ENCOUNTER — HOSPITAL ENCOUNTER (EMERGENCY)
Age: 74
Discharge: HOME | End: 2024-12-27
Payer: MEDICARE

## 2024-12-27 ENCOUNTER — APPOINTMENT (OUTPATIENT)
Dept: CT IMAGING | Age: 74
End: 2024-12-27
Payer: MEDICARE

## 2024-12-27 VITALS
WEIGHT: 158 LBS | RESPIRATION RATE: 16 BRPM | TEMPERATURE: 97.7 F | SYSTOLIC BLOOD PRESSURE: 153 MMHG | HEART RATE: 75 BPM | BODY MASS INDEX: 26.29 KG/M2 | DIASTOLIC BLOOD PRESSURE: 75 MMHG | OXYGEN SATURATION: 97 %

## 2024-12-27 DIAGNOSIS — N28.89 CALIECTASIS: ICD-10-CM

## 2024-12-27 DIAGNOSIS — M54.9 ACUTE RIGHT-SIDED BACK PAIN, UNSPECIFIED BACK LOCATION: Primary | ICD-10-CM

## 2024-12-27 LAB
ALBUMIN SERPL-MCNC: 4.3 G/DL (ref 3.5–5.2)
ALBUMIN/GLOB SERPL: 1.3 {RATIO} (ref 1–2.5)
ALP SERPL-CCNC: 178 U/L (ref 35–104)
ALT SERPL-CCNC: 27 U/L (ref 10–35)
ANION GAP SERPL CALCULATED.3IONS-SCNC: 10 MMOL/L (ref 9–16)
AST SERPL-CCNC: 39 U/L (ref 10–35)
BASOPHILS # BLD: <0.03 K/UL (ref 0–0.2)
BASOPHILS NFR BLD: 0 % (ref 0–2)
BILIRUB DIRECT SERPL-MCNC: 0.2 MG/DL (ref 0–0.2)
BILIRUB INDIRECT SERPL-MCNC: 0.2 MG/DL
BILIRUB SERPL-MCNC: 0.4 MG/DL (ref 0–1.2)
BILIRUB UR QL STRIP: NEGATIVE
BUN SERPL-MCNC: 12 MG/DL (ref 8–23)
CALCIUM SERPL-MCNC: 10.1 MG/DL (ref 8.8–10.2)
CHLORIDE SERPL-SCNC: 105 MMOL/L (ref 98–107)
CLARITY UR: CLEAR
CO2 SERPL-SCNC: 25 MMOL/L (ref 20–31)
COLOR UR: YELLOW
CREAT SERPL-MCNC: 0.6 MG/DL (ref 0.5–0.9)
EOSINOPHIL # BLD: 0.04 K/UL (ref 0–0.44)
EOSINOPHILS RELATIVE PERCENT: 1 % (ref 1–4)
ERYTHROCYTE [DISTWIDTH] IN BLOOD BY AUTOMATED COUNT: 13.6 % (ref 11.8–14.4)
GFR, ESTIMATED: >90 ML/MIN/1.73M2
GLUCOSE SERPL-MCNC: 96 MG/DL (ref 82–115)
GLUCOSE UR STRIP-MCNC: NEGATIVE MG/DL
HCT VFR BLD AUTO: 45 % (ref 36.3–47.1)
HGB BLD-MCNC: 14.6 G/DL (ref 11.9–15.1)
HGB UR QL STRIP.AUTO: NEGATIVE
IMM GRANULOCYTES # BLD AUTO: 0.01 K/UL (ref 0–0.3)
IMM GRANULOCYTES NFR BLD: 0 %
KETONES UR STRIP-MCNC: NEGATIVE MG/DL
LEUKOCYTE ESTERASE UR QL STRIP: NEGATIVE
LIPASE SERPL-CCNC: 40 U/L (ref 13–60)
LYMPHOCYTES NFR BLD: 1.12 K/UL (ref 1.1–3.7)
LYMPHOCYTES RELATIVE PERCENT: 19 % (ref 24–43)
MCH RBC QN AUTO: 29.1 PG (ref 25.2–33.5)
MCHC RBC AUTO-ENTMCNC: 32.4 G/DL (ref 28.4–34.8)
MCV RBC AUTO: 89.8 FL (ref 82.6–102.9)
MONOCYTES NFR BLD: 0.37 K/UL (ref 0.1–1.2)
MONOCYTES NFR BLD: 6 % (ref 3–12)
NEUTROPHILS NFR BLD: 74 % (ref 36–65)
NEUTS SEG NFR BLD: 4.22 K/UL (ref 1.5–8.1)
NITRITE UR QL STRIP: NEGATIVE
NRBC BLD-RTO: 0 PER 100 WBC
PH UR STRIP: 6 [PH] (ref 5–8)
PLATELET # BLD AUTO: 183 K/UL (ref 138–453)
PMV BLD AUTO: 10.2 FL (ref 8.1–13.5)
POTASSIUM SERPL-SCNC: 4.1 MMOL/L (ref 3.7–5.3)
PROT SERPL-MCNC: 7.5 G/DL (ref 6.6–8.7)
PROT UR STRIP-MCNC: NEGATIVE MG/DL
RBC # BLD AUTO: 5.01 M/UL (ref 3.95–5.11)
SODIUM SERPL-SCNC: 140 MMOL/L (ref 136–145)
SP GR UR STRIP: 1.01 (ref 1–1.03)
TROPONIN I SERPL HS-MCNC: <6 NG/L (ref 0–14)
UROBILINOGEN UR STRIP-ACNC: NORMAL EU/DL (ref 0–1)
WBC OTHER # BLD: 5.8 K/UL (ref 3.5–11.3)

## 2024-12-27 PROCEDURE — 6360000002 HC RX W HCPCS

## 2024-12-27 PROCEDURE — 83690 ASSAY OF LIPASE: CPT

## 2024-12-27 PROCEDURE — 80076 HEPATIC FUNCTION PANEL: CPT

## 2024-12-27 PROCEDURE — 74177 CT ABD & PELVIS W/CONTRAST: CPT

## 2024-12-27 PROCEDURE — 2500000003 HC RX 250 WO HCPCS

## 2024-12-27 PROCEDURE — 84484 ASSAY OF TROPONIN QUANT: CPT

## 2024-12-27 PROCEDURE — 85025 COMPLETE CBC W/AUTO DIFF WBC: CPT

## 2024-12-27 PROCEDURE — 72070 X-RAY EXAM THORAC SPINE 2VWS: CPT

## 2024-12-27 PROCEDURE — 6370000000 HC RX 637 (ALT 250 FOR IP)

## 2024-12-27 PROCEDURE — 81003 URINALYSIS AUTO W/O SCOPE: CPT

## 2024-12-27 PROCEDURE — 2580000003 HC RX 258

## 2024-12-27 PROCEDURE — 99285 EMERGENCY DEPT VISIT HI MDM: CPT

## 2024-12-27 PROCEDURE — 71045 X-RAY EXAM CHEST 1 VIEW: CPT

## 2024-12-27 PROCEDURE — 6360000004 HC RX CONTRAST MEDICATION

## 2024-12-27 PROCEDURE — 80048 BASIC METABOLIC PNL TOTAL CA: CPT

## 2024-12-27 PROCEDURE — 96374 THER/PROPH/DIAG INJ IV PUSH: CPT

## 2024-12-27 RX ORDER — LIDOCAINE 50 MG/G
1 PATCH TOPICAL DAILY
Qty: 10 PATCH | Refills: 0 | Status: SHIPPED | OUTPATIENT
Start: 2024-12-27 | End: 2025-01-06

## 2024-12-27 RX ORDER — METHOCARBAMOL 750 MG/1
750 TABLET, FILM COATED ORAL ONCE
Status: COMPLETED | OUTPATIENT
Start: 2024-12-27 | End: 2024-12-27

## 2024-12-27 RX ORDER — IBUPROFEN 400 MG/1
400 TABLET, FILM COATED ORAL EVERY 6 HOURS PRN
Qty: 30 TABLET | Refills: 0 | Status: SHIPPED | OUTPATIENT
Start: 2024-12-27

## 2024-12-27 RX ORDER — METHOCARBAMOL 750 MG/1
750 TABLET, FILM COATED ORAL 3 TIMES DAILY PRN
Qty: 20 TABLET | Refills: 0 | Status: SHIPPED | OUTPATIENT
Start: 2024-12-27

## 2024-12-27 RX ORDER — IOPAMIDOL 755 MG/ML
75 INJECTION, SOLUTION INTRAVASCULAR
Status: COMPLETED | OUTPATIENT
Start: 2024-12-27 | End: 2024-12-27

## 2024-12-27 RX ORDER — 0.9 % SODIUM CHLORIDE 0.9 %
80 INTRAVENOUS SOLUTION INTRAVENOUS ONCE
Status: COMPLETED | OUTPATIENT
Start: 2024-12-27 | End: 2024-12-27

## 2024-12-27 RX ORDER — LIDOCAINE 4 G/G
1 PATCH TOPICAL ONCE
Status: DISCONTINUED | OUTPATIENT
Start: 2024-12-27 | End: 2024-12-27 | Stop reason: HOSPADM

## 2024-12-27 RX ORDER — KETOROLAC TROMETHAMINE 15 MG/ML
15 INJECTION, SOLUTION INTRAMUSCULAR; INTRAVENOUS ONCE
Status: COMPLETED | OUTPATIENT
Start: 2024-12-27 | End: 2024-12-27

## 2024-12-27 RX ORDER — NAPROXEN 250 MG/1
250 TABLET ORAL 2 TIMES DAILY PRN
Qty: 20 TABLET | Refills: 0 | Status: SHIPPED | OUTPATIENT
Start: 2024-12-27 | End: 2024-12-27

## 2024-12-27 RX ORDER — SODIUM CHLORIDE 0.9 % (FLUSH) 0.9 %
10 SYRINGE (ML) INJECTION ONCE
Status: COMPLETED | OUTPATIENT
Start: 2024-12-27 | End: 2024-12-27

## 2024-12-27 RX ADMIN — SODIUM CHLORIDE 80 ML: 9 INJECTION, SOLUTION INTRAVENOUS at 13:24

## 2024-12-27 RX ADMIN — IOPAMIDOL 75 ML: 755 INJECTION, SOLUTION INTRAVENOUS at 13:24

## 2024-12-27 RX ADMIN — SODIUM CHLORIDE, PRESERVATIVE FREE 10 ML: 5 INJECTION INTRAVENOUS at 13:24

## 2024-12-27 RX ADMIN — METHOCARBAMOL 750 MG: 750 TABLET ORAL at 16:08

## 2024-12-27 RX ADMIN — KETOROLAC TROMETHAMINE 15 MG: 15 INJECTION, SOLUTION INTRAMUSCULAR; INTRAVENOUS at 15:05

## 2024-12-27 ASSESSMENT — PAIN DESCRIPTION - FREQUENCY: FREQUENCY: INTERMITTENT

## 2024-12-27 ASSESSMENT — LIFESTYLE VARIABLES
HOW OFTEN DO YOU HAVE A DRINK CONTAINING ALCOHOL: MONTHLY OR LESS
HOW MANY STANDARD DRINKS CONTAINING ALCOHOL DO YOU HAVE ON A TYPICAL DAY: 1 OR 2

## 2024-12-27 ASSESSMENT — PAIN DESCRIPTION - DESCRIPTORS: DESCRIPTORS: SPASM;STABBING

## 2024-12-27 ASSESSMENT — PAIN SCALES - GENERAL
PAINLEVEL_OUTOF10: 9
PAINLEVEL_OUTOF10: 10
PAINLEVEL_OUTOF10: 6

## 2024-12-27 ASSESSMENT — PAIN DESCRIPTION - LOCATION: LOCATION: BACK

## 2024-12-27 ASSESSMENT — PAIN - FUNCTIONAL ASSESSMENT: PAIN_FUNCTIONAL_ASSESSMENT: 0-10

## 2024-12-27 ASSESSMENT — ENCOUNTER SYMPTOMS
BACK PAIN: 1
NAUSEA: 0
SHORTNESS OF BREATH: 0
ABDOMINAL PAIN: 0
VOMITING: 0

## 2024-12-27 NOTE — ED PROVIDER NOTES
[facility] EMERGENCY DEPARTMENT  Emergency Department Encounter  Emergency Medicine Attending     Pt Name:Moraima Tejada  MRN: [id number]  Birthdate 1950  Date of evaluation: 12/27/24  PCP:  Linette Bautista MD  Note Started: 12:22 PM EST      CHIEF COMPLAINT       Chief Complaint   Patient presents with    Back Pain     Onset 12/24       HISTORY OF PRESENT ILLNESS  (Location/Symptom, Timing/Onset, Context/Setting, Quality, Duration, Modifying Factors, Severity.)      74-year-old female presenting for evaluation of right-sided back pain over the past 3 days.  Patient states after she had [place] [employer] dinner she was standing up and washing dishes and she had sudden onset of sharp stabbing right sided back pain.  No associated nausea or vomiting.  No fevers.  No chest pain or shortness of breath.  She denied any urinary symptoms such as dysuria or hematuria or frequency.  She states her symptoms have been intermittent over the last few days and are sharp and severe when they do present and then her symptoms resolved.  She denies any sciatic symptoms or numbness or weakness in her legs.  Denies any bowel or bladder retention.            PAST MEDICAL / SURGICAL / SOCIAL / FAMILY HISTORY      has a past medical history of Anesthesia complication, Kidney cysts, Palpitations, Splenic cyst, Tennis elbow, and Thyroid disease.     has a past surgical history that includes Hysterectomy (11/01/1999); back surgery (06/10/2010); Breast surgery (Right, 09/1978); Colonoscopy (06/2014); Dilation and curettage of uterus; transesophageal echocardiogram (10/06/2023); and Insertable Cardiac Monitor (10/06/2023).    Social History     Socioeconomic History    Marital status:      Spouse name: Not on file    Number of children: Not on file    Years of education: Not on file    Highest education level: Not on file   Occupational History    Not on file   Tobacco Use    Smoking status: Never    Smokeless tobacco: Never

## 2024-12-27 NOTE — ED NOTES
Pt presenting to the ED with complaints of back pain. Pt reports that she was standing at the sink on [address] and noticed the pain. Pt reports no improvement in pain in the last few days.

## 2024-12-29 LAB
EKG ATRIAL RATE: 60 BPM
EKG P AXIS: 72 DEGREES
EKG P-R INTERVAL: 150 MS
EKG Q-T INTERVAL: 396 MS
EKG QRS DURATION: 78 MS
EKG QTC CALCULATION (BAZETT): 396 MS
EKG R AXIS: 43 DEGREES
EKG T AXIS: 54 DEGREES
EKG VENTRICULAR RATE: 60 BPM

## 2024-12-30 ENCOUNTER — TELEPHONE (OUTPATIENT)
Dept: NEUROLOGY | Age: 74
End: 2024-12-30

## 2024-12-30 NOTE — TELEPHONE ENCOUNTER
12 30 2024 called patient times 2 at  to reschedule 01 31 2025 appointment with Dr. Ortiz, left message on machine, no response.  I cancelled the appointment and I mailed the patient a letter asking them to call the office back to reschedule this appointment.  KS

## 2025-08-04 RX ORDER — ACETAMINOPHEN 325 MG/1
650 TABLET ORAL EVERY 4 HOURS PRN
COMMUNITY

## 2025-08-05 ENCOUNTER — OFFICE VISIT (OUTPATIENT)
Dept: NEUROLOGY | Age: 75
End: 2025-08-05
Payer: MEDICARE

## 2025-08-05 VITALS
BODY MASS INDEX: 27.19 KG/M2 | WEIGHT: 163.2 LBS | HEIGHT: 65 IN | HEART RATE: 80 BPM | SYSTOLIC BLOOD PRESSURE: 126 MMHG | DIASTOLIC BLOOD PRESSURE: 81 MMHG

## 2025-08-05 DIAGNOSIS — G47.33 OBSTRUCTIVE SLEEP APNEA SYNDROME: ICD-10-CM

## 2025-08-05 DIAGNOSIS — G43.109 MIGRAINE WITH AURA AND WITHOUT STATUS MIGRAINOSUS, NOT INTRACTABLE: ICD-10-CM

## 2025-08-05 DIAGNOSIS — I63.9 CEREBRAL INFARCTION, UNSPECIFIED MECHANISM (HCC): Primary | ICD-10-CM

## 2025-08-05 PROCEDURE — 99214 OFFICE O/P EST MOD 30 MIN: CPT | Performed by: PSYCHIATRY & NEUROLOGY

## 2025-08-05 PROCEDURE — 3074F SYST BP LT 130 MM HG: CPT | Performed by: PSYCHIATRY & NEUROLOGY

## 2025-08-05 PROCEDURE — 1123F ACP DISCUSS/DSCN MKR DOCD: CPT | Performed by: PSYCHIATRY & NEUROLOGY

## 2025-08-05 PROCEDURE — 1159F MED LIST DOCD IN RCRD: CPT | Performed by: PSYCHIATRY & NEUROLOGY

## 2025-08-05 PROCEDURE — 3079F DIAST BP 80-89 MM HG: CPT | Performed by: PSYCHIATRY & NEUROLOGY

## 2025-08-05 ASSESSMENT — ENCOUNTER SYMPTOMS
RESPIRATORY NEGATIVE: 1
GASTROINTESTINAL NEGATIVE: 1
EYES NEGATIVE: 1
ALLERGIC/IMMUNOLOGIC NEGATIVE: 1